# Patient Record
Sex: MALE | Race: WHITE | NOT HISPANIC OR LATINO | Employment: FULL TIME | URBAN - METROPOLITAN AREA
[De-identification: names, ages, dates, MRNs, and addresses within clinical notes are randomized per-mention and may not be internally consistent; named-entity substitution may affect disease eponyms.]

---

## 2018-01-22 DIAGNOSIS — E78.2 MIXED HYPERLIPIDEMIA: ICD-10-CM

## 2018-01-22 RX ORDER — ATORVASTATIN CALCIUM 40 MG/1
40 TABLET, FILM COATED ORAL DAILY
Qty: 90 TABLET | Refills: 0 | Status: SHIPPED | OUTPATIENT
Start: 2018-01-22 | End: 2018-03-13

## 2018-01-22 NOTE — TELEPHONE ENCOUNTER
"Requested Prescriptions   Pending Prescriptions Disp Refills     atorvastatin (LIPITOR) 40 MG tablet 90 tablet 3    Last Written Prescription Date:  12/20/2016  Last Fill Quantity: 90 tablet,  # refills: 3   Last Office Visit with G, P or [facility] prescribing provider:  12/20/2016   Future Office Visit:      Sig: Take 1 tablet (40 mg) by mouth daily    Statins Protocol Failed    1/22/2018 10:15 AM       Failed - LDL on file in past 12 months    Recent Labs   Lab Test  12/08/16   0815   LDL  199*            Failed - Recent or future visit with authorizing provider    Patient had office visit in the last year or has a visit in the next 30 days with authorizing provider.  See \"Patient Info\" tab in inbasket, or \"Choose Columns\" in Meds & Orders section of the refill encounter.            Passed - No abnormal creatine kinase in past 12 months    No lab results found.         Passed - Patient is age 18 or older          Faisal COS[employer]  "

## 2018-01-22 NOTE — TELEPHONE ENCOUNTER
Spoke with pt and scheduled for Friday  Prescription approved per [facility] Refill Protocol.  Luis Eduardo Whitt RN, BSN

## 2018-03-13 ENCOUNTER — OFFICE VISIT (OUTPATIENT)
Dept: FAMILY MEDICINE | Facility: CLINIC | Age: 33
End: 2018-03-13
Payer: COMMERCIAL

## 2018-03-13 VITALS
DIASTOLIC BLOOD PRESSURE: 86 MMHG | TEMPERATURE: 98.2 F | HEART RATE: 78 BPM | SYSTOLIC BLOOD PRESSURE: 120 MMHG | HEIGHT: 72 IN | BODY MASS INDEX: 30.75 KG/M2 | WEIGHT: 227 LBS

## 2018-03-13 DIAGNOSIS — R07.9 CHEST PAIN, UNSPECIFIED TYPE: ICD-10-CM

## 2018-03-13 DIAGNOSIS — E78.2 MIXED HYPERLIPIDEMIA: ICD-10-CM

## 2018-03-13 DIAGNOSIS — Z00.00 ENCOUNTER FOR ROUTINE ADULT HEALTH EXAMINATION WITHOUT ABNORMAL FINDINGS: Primary | ICD-10-CM

## 2018-03-13 LAB
CHOLEST SERPL-MCNC: 221 MG/DL
HBA1C MFR BLD: 5.5 % (ref 4.3–6)
HDLC SERPL-MCNC: 27 MG/DL
LDLC SERPL CALC-MCNC: 134 MG/DL
NONHDLC SERPL-MCNC: 194 MG/DL
TRIGL SERPL-MCNC: 300 MG/DL

## 2018-03-13 PROCEDURE — 36415 COLL VENOUS BLD VENIPUNCTURE: CPT | Performed by: FAMILY MEDICINE

## 2018-03-13 PROCEDURE — 80061 LIPID PANEL: CPT | Performed by: FAMILY MEDICINE

## 2018-03-13 PROCEDURE — 99213 OFFICE O/P EST LOW 20 MIN: CPT | Mod: 25 | Performed by: FAMILY MEDICINE

## 2018-03-13 PROCEDURE — 99395 PREV VISIT EST AGE 18-39: CPT | Performed by: FAMILY MEDICINE

## 2018-03-13 PROCEDURE — 83036 HEMOGLOBIN GLYCOSYLATED A1C: CPT | Performed by: FAMILY MEDICINE

## 2018-03-13 PROCEDURE — 93000 ELECTROCARDIOGRAM COMPLETE: CPT | Performed by: FAMILY MEDICINE

## 2018-03-13 RX ORDER — ATORVASTATIN CALCIUM 40 MG/1
40 TABLET, FILM COATED ORAL DAILY
Qty: 90 TABLET | Refills: 3 | Status: SHIPPED | OUTPATIENT
Start: 2018-03-13 | End: 2023-12-11

## 2018-03-13 NOTE — PROGRESS NOTES
SUBJECTIVE:   CC: Vinod Hdez is an 32 year old male who presents for preventative health visit.     Physical   Annual:     Getting at least 3 servings of Calcium per day::  NO    Bi-annual eye exam::  Yes    Dental care twice a year::  Yes    Sleep apnea or symptoms of sleep apnea::  None    Diet::  Regular (no restrictions) and Breakfast skipped    Frequency of exercise::  None    Taking medications regularly::  Yes    Medication side effects::  None    Additional concerns today::  YES          2 episodes of left sided chest tightness, resolved on it's own after a few minmutes, radiated to the left shoulder. No dyspnea. First episode, he was sitting at his desk, did not feel overly stressed. Second episode, he was giving a tour, up and walking and talking.     Says he still feels some mild pressure in the same area.     No recent injuries. No pain to palpation of the chest. No recent URI symptoms.     On lipitor.   Dad with CVD, MI in 40s.      Today's PHQ-2 Score:   PHQ-2 ( 1999 Pfizer) 3/13/2018   Q1: Little interest or pleasure in doing things 0   Q2: Feeling down, depressed or hopeless 0   PHQ-2 Score 0   Q1: Little interest or pleasure in doing things Not at all   Q2: Feeling down, depressed or hopeless Not at all   PHQ-2 Score 0       Abuse: Current or Past(Physical, Sexual or Emotional)- No  Do you feel safe in your environment - Yes    Social History   Substance Use Topics     Smoking status: Never Smoker     Smokeless tobacco: Never Used     Alcohol use 6.0 oz/week      Comment: occ     No flowsheet data found.    Last PSA: No results found for: PSA    Reviewed orders with patient. Reviewed health maintenance and updated orders accordingly - Yes  Patient Active Problem List   Diagnosis     Hyperlipidemia     Elevated blood sugar     Past Surgical History:   Procedure Laterality Date     FLEXIBLE SIGMOIDOSCOPY  2002    hematochezia - negative       Social History   Substance Use Topics     Smoking  "status: Never Smoker     Smokeless tobacco: Never Used     Alcohol use 6.0 oz/week      Comment: occ     Family History   Problem Relation Age of Onset     HEART DISEASE Father      palpitations     Lipids Father      DIABETES Paternal Grandfather      Genitourinary Problems Mother      kidney stones     Lipids Mother            Reviewed and updated as needed this visit by clinical staff  Tobacco  Meds  Med Hx  Surg Hx  Fam Hx  Soc Hx        Reviewed and updated as needed this visit by Provider          Review of Systems  C: NEGATIVE for fever, chills, change in weight  I: NEGATIVE for worrisome rashes, moles or lesions  E: NEGATIVE for vision changes or irritation  ENT: NEGATIVE for ear, mouth and throat problems  R: NEGATIVE for significant cough or SOB  CV: NEGATIVE for chest pain, palpitations or peripheral edema  GI: NEGATIVE for nausea, abdominal pain, heartburn, or change in bowel habits   male: negative for dysuria, hematuria, decreased urinary stream, erectile dysfunction, urethral discharge  M: NEGATIVE for significant arthralgias or myalgia  N: NEGATIVE for weakness, dizziness or paresthesias  P: NEGATIVE for changes in mood or affect    OBJECTIVE:   /86 (BP Location: Right arm, Patient Position: Sitting, Cuff Size: Adult Large)  Pulse 78  Temp 98.2  F (36.8  C) (Oral)  Ht 5' 11.75\" (1.822 m)  Wt 227 lb (103 kg)  BMI 31 kg/m2    Physical Exam  GENERAL: healthy, alert and no distress  EYES: Eyes grossly normal to inspection, PERRL and conjunctivae and sclerae normal  HENT: ear canals and TM's normal, nose and mouth without ulcers or lesions  NECK: no adenopathy, no asymmetry, masses, or scars and thyroid normal to palpation  RESP: lungs clear to auscultation - no rales, rhonchi or wheezes  CV: regular rate and rhythm, normal S1 S2, no S3 or S4, no murmur, click or rub, no peripheral edema and peripheral pulses strong  ABDOMEN: soft, nontender, no hepatosplenomegaly, no masses and bowel " "sounds normal  MS: no ttp along the left chest wall, no gross musculoskeletal defects noted, no edema  SKIN: no suspicious lesions or rashes  NEURO: Normal strength and tone, mentation intact and speech normal  PSYCH: mentation appears normal, affect normal/bright    EKG - normal    ASSESSMENT/PLAN:     1. Encounter for routine adult health examination without abnormal findings  - Lipid panel reflex to direct LDL Fasting  - Hemoglobin A1c    2. Chest pain, unspecified type - young for angina, but symptoms are suspicious. As dad had CVD at a young age, will get stress testing to evaluate. He is agreeable.  - EKG 12-lead complete w/read - Clinics  - CARDIOLOGY PROCEDURE ADULT REFERRAL  - Exercise Stress Echocardiogram; Future    3. Mixed hyperlipidemia - previously stable, refills  - atorvastatin (LIPITOR) 40 MG tablet; Take 1 tablet (40 mg) by mouth daily  Dispense: 90 tablet; Refill: 3    COUNSELING:   Reviewed preventive health counseling, as reflected in patient instructions     reports that he has never smoked. He has never used smokeless tobacco.    Estimated body mass index is 31 kg/(m^2) as calculated from the following:    Height as of this encounter: 5' 11.75\" (1.822 m).    Weight as of this encounter: 227 lb (103 kg).       Marium Momin MD  [facility]    Answers for HPI/ROS submitted by the patient on 3/13/2018   PHQ-2 Score: 0    "

## 2018-03-13 NOTE — MR AVS SNAPSHOT
After Visit Summary   3/13/2018    Vinod Hdez    MRN: [id number]           Patient Information     Date Of Birth          1985        Visit Information        Provider Department      3/13/2018 7:40 AM Marium Momin MD [facility]        Today's Diagnoses     Encounter for routine adult health examination without abnormal findings    -  1    Chest pain, unspecified type        Mixed hyperlipidemia          Care Instructions      Preventive Health Recommendations  Male Ages 26 - 39    Yearly exam:             See your health care provider every year in order to  o   Review health changes.   o   Discuss preventive care.    o   Review your medicines if your doctor has prescribed any.    You should be tested each year for STDs (sexually transmitted diseases), if you re at risk.     After age 35, talk to your provider about cholesterol testing. If you are at risk for heart disease, have your cholesterol tested at least every 5 years.     If you are at risk for diabetes, you should have a diabetes test (fasting glucose).  Shots: Get a flu shot each year. Get a tetanus shot every 10 years.     Nutrition:    Eat at least 5 servings of fruits and vegetables daily.     Eat whole-grain bread, whole-wheat pasta and brown rice instead of white grains and rice.     Talk to your provider about Calcium and Vitamin D.     Lifestyle    Exercise for at least 150 minutes a week (30 minutes a day, 5 days a week). This will help you control your weight and prevent disease.     Limit alcohol to one drink per day.     No smoking.     Wear sunscreen to prevent skin cancer.     See your dentist every six months for an exam and cleaning.             Follow-ups after your visit        Additional Services     CARDIOLOGY PROCEDURE ADULT REFERRAL       Your provider has referred you to:   [facility]: [facility] [phone]    [email or web address]    Cardiology procedures to be completed: stress echo, reason for procedure left sided chest pain episodes    Please be aware that coverage of these services is subject to the terms and limitations of your health insurance plan.  Call member services at your health plan with any benefit or coverage questions.     Please bring the following to your appointment:  >>   Any x-rays, CTs or MRIs which have been performed.  Contact the facility where they were done to arrange for  prior to your scheduled appointment.   >>   List of current medications  >>   This referral request   >>   Any documents/labs given to you for this referral    [facility]   For scheduling questions call [phone]    [facility]  For scheduling questions call [phone]                  Future tests that were ordered for you today     Open Future Orders        Priority Expected Expires Ordered    Exercise Stress Echocardiogram Routine  3/13/2019 3/13/2018            Who to contact     If you have questions or need follow up information about today's clinic visit or your schedule please contact [facility] directly at [phone].  Normal or non-critical lab and imaging results will be communicated to you by MyChart, letter or phone within 4 business days after the clinic has received the results. If you do not hear from us within 7 days, please contact the clinic through Check[employer]Oh[employer] or phone. If you have a critical or abnormal lab result, we will notify you by phone as soon as possible.  Submit refill requests through [employer] or call your pharmacy and they will forward the refill request to us. Please allow 3 business days for your refill to be completed.          Additional Information About Your Visit        Check[employer]oudsnap Information     [employer] gives you secure access to your electronic health record. If you see  "a primary care provider, you can also send messages to your care team and make appointments. If you have questions, please call your primary care clinic.  If you do not have a primary care provider, please call [phone] and they will assist you.        Care EveryWhere ID     This is your Care EveryWhere ID. This could be used by other organizations to access your [facility] medical records  [id number]        Your Vitals Were     Pulse Temperature Height BMI (Body Mass Index)          78 98.2  F (36.8  C) (Oral) 5' 11.75\" (1.822 m) 31 kg/m2         Blood Pressure from Last 3 Encounters:   03/13/18 120/86   12/20/16 112/78   08/16/16 116/78    Weight from Last 3 Encounters:   03/13/18 227 lb (103 kg)   12/20/16 213 lb (96.6 kg)   08/16/16 215 lb (97.5 kg)              We Performed the Following     CARDIOLOGY PROCEDURE ADULT REFERRAL     EKG 12-lead complete w/read - Clinics     Hemoglobin A1c     Lipid panel reflex to direct LDL Fasting          Where to get your medicines      These medications were sent to [facility]/pharmacy #[id number] - [place], [place] - [address]  [address], [facility] [address]    Hours:  [facility] Phone:  [phone]     atorvastatin 40 MG tablet          Primary Care Provider Office Phone # Fax #    Marium Herminio Momin [phone] [address]  [facility] [address]        Equal Access to Services     ALLY DE JESUS AH: Hadii aad ku hadasho Soomaali, waaxda luqadaha, qaybta kaalmada adeegyada, cliff doshi. So [facility] [phone].    ATENCIÓN: Si habla español, tiene a martin disposición servicios gratuitos de asistencia lingüística. Llame al [phone].    We comply with applicable federal civil rights laws and [place] laws. We do not discriminate on the basis of race, color, national origin, age, disability, sex, sexual orientation, or gender identity.            Thank you!     Thank you for choosing [facility]  for your care. Our goal is always to provide you with excellent care. Hearing back from our patients is one way we can continue to improve our services. Please take a few minutes to complete the written survey that you may receive in the mail after your visit with us. Thank you!             Your Updated Medication List - Protect others around you: Learn how to safely use, store and throw away your medicines at www.disposemymeds.org.          This list is accurate as of 3/13/18  8:16 AM.  Always use your most recent med list.                   Brand Name Dispense Instructions for use Diagnosis    atorvastatin 40 MG tablet    LIPITOR    90 tablet    Take 1 tablet (40 mg) by mouth daily    Mixed hyperlipidemia       FISH OIL OMEGA-3 PO      Take 1,000 mg by mouth    Chest pain, unspecified type

## 2018-03-13 NOTE — NURSING NOTE
"Chief Complaint   Patient presents with     Physical     fasting for labs       Initial /86 (BP Location: Right arm, Patient Position: Sitting, Cuff Size: Adult Large)  Pulse 78  Temp 98.2  F (36.8  C) (Oral)  Ht 5' 11.75\" (1.822 m)  Wt 227 lb (103 kg)  BMI 31 kg/m2 Estimated body mass index is 31 kg/(m^2) as calculated from the following:    Height as of this encounter: 5' 11.75\" (1.822 m).    Weight as of this encounter: 227 lb (103 kg).  Medication Reconciliation: complete     Jaun Hernandez CMA          "

## 2018-03-21 ENCOUNTER — HOSPITAL ENCOUNTER (OUTPATIENT)
Dept: CARDIOLOGY | Facility: CLINIC | Age: 33
Discharge: HOME | End: 2018-03-21
Attending: FAMILY MEDICINE | Admitting: FAMILY MEDICINE
Payer: COMMERCIAL

## 2018-03-21 DIAGNOSIS — R07.9 CHEST PAIN, UNSPECIFIED TYPE: ICD-10-CM

## 2018-03-21 PROCEDURE — 93321 DOPPLER ECHO F-UP/LMTD STD: CPT | Mod: 26 | Performed by: INTERNAL MEDICINE

## 2018-03-21 PROCEDURE — 93016 CV STRESS TEST SUPVJ ONLY: CPT | Performed by: INTERNAL MEDICINE

## 2018-03-21 PROCEDURE — 93350 STRESS TTE ONLY: CPT | Mod: 26 | Performed by: INTERNAL MEDICINE

## 2018-03-21 PROCEDURE — 93325 DOPPLER ECHO COLOR FLOW MAPG: CPT | Mod: 26 | Performed by: INTERNAL MEDICINE

## 2018-03-21 PROCEDURE — 25500064 ZZH RX 255 OP 636: Performed by: FAMILY MEDICINE

## 2018-03-21 PROCEDURE — 93018 CV STRESS TEST I&R ONLY: CPT | Performed by: INTERNAL MEDICINE

## 2018-03-21 PROCEDURE — 93321 DOPPLER ECHO F-UP/LMTD STD: CPT | Mod: TC

## 2018-03-21 RX ADMIN — HUMAN ALBUMIN MICROSPHERES AND PERFLUTREN 4 ML: 10; .22 INJECTION, SOLUTION INTRAVENOUS at 09:27

## 2019-11-08 ENCOUNTER — HEALTH MAINTENANCE LETTER (OUTPATIENT)
Age: 34
End: 2019-11-08

## 2020-12-06 ENCOUNTER — HEALTH MAINTENANCE LETTER (OUTPATIENT)
Age: 35
End: 2020-12-06

## 2021-09-25 ENCOUNTER — HEALTH MAINTENANCE LETTER (OUTPATIENT)
Age: 36
End: 2021-09-25

## 2021-11-19 ENCOUNTER — HOSPITAL ENCOUNTER (EMERGENCY)
Facility: CLINIC | Age: 36
Discharge: HOME | End: 2021-11-19
Attending: EMERGENCY MEDICINE | Admitting: EMERGENCY MEDICINE
Payer: COMMERCIAL

## 2021-11-19 ENCOUNTER — APPOINTMENT (OUTPATIENT)
Dept: GENERAL RADIOLOGY | Facility: CLINIC | Age: 36
End: 2021-11-19
Attending: EMERGENCY MEDICINE
Payer: COMMERCIAL

## 2021-11-19 VITALS
OXYGEN SATURATION: 100 % | SYSTOLIC BLOOD PRESSURE: 115 MMHG | DIASTOLIC BLOOD PRESSURE: 78 MMHG | RESPIRATION RATE: 16 BRPM | HEART RATE: 107 BPM | TEMPERATURE: 97.2 F

## 2021-11-19 DIAGNOSIS — R07.9 CHEST PAIN, UNSPECIFIED TYPE: ICD-10-CM

## 2021-11-19 DIAGNOSIS — R03.0 ELEVATED BP WITHOUT DIAGNOSIS OF HYPERTENSION: ICD-10-CM

## 2021-11-19 LAB
ANION GAP SERPL CALCULATED.3IONS-SCNC: 6 MMOL/L (ref 3–14)
BASOPHILS # BLD AUTO: 0 10E3/UL (ref 0–0.2)
BASOPHILS NFR BLD AUTO: 0 %
BUN SERPL-MCNC: 17 MG/DL (ref 7–30)
CALCIUM SERPL-MCNC: 9.5 MG/DL (ref 8.5–10.1)
CHLORIDE BLD-SCNC: 105 MMOL/L (ref 94–109)
CO2 SERPL-SCNC: 23 MMOL/L (ref 20–32)
CREAT SERPL-MCNC: 1.01 MG/DL (ref 0.66–1.25)
D DIMER PPP FEU-MCNC: <0.27 UG/ML FEU (ref 0–0.5)
EOSINOPHIL # BLD AUTO: 0.3 10E3/UL (ref 0–0.7)
EOSINOPHIL NFR BLD AUTO: 4 %
ERYTHROCYTE [DISTWIDTH] IN BLOOD BY AUTOMATED COUNT: 11.3 % (ref 10–15)
GFR SERPL CREATININE-BSD FRML MDRD: >90 ML/MIN/1.73M2
GLUCOSE BLD-MCNC: 111 MG/DL (ref 70–99)
HCT VFR BLD AUTO: 48 % (ref 40–53)
HGB BLD-MCNC: 15.5 G/DL (ref 13.3–17.7)
HOLD SPECIMEN: NORMAL
IMM GRANULOCYTES # BLD: 0 10E3/UL
IMM GRANULOCYTES NFR BLD: 0 %
LYMPHOCYTES # BLD AUTO: 2.1 10E3/UL (ref 0.8–5.3)
LYMPHOCYTES NFR BLD AUTO: 26 %
MCH RBC QN AUTO: 30.3 PG (ref 26.5–33)
MCHC RBC AUTO-ENTMCNC: 32.3 G/DL (ref 31.5–36.5)
MCV RBC AUTO: 94 FL (ref 78–100)
MONOCYTES # BLD AUTO: 0.7 10E3/UL (ref 0–1.3)
MONOCYTES NFR BLD AUTO: 8 %
NEUTROPHILS # BLD AUTO: 5 10E3/UL (ref 1.6–8.3)
NEUTROPHILS NFR BLD AUTO: 62 %
NRBC # BLD AUTO: 0 10E3/UL
NRBC BLD AUTO-RTO: 0 /100
PLATELET # BLD AUTO: 288 10E3/UL (ref 150–450)
POTASSIUM BLD-SCNC: 3.9 MMOL/L (ref 3.4–5.3)
RBC # BLD AUTO: 5.11 10E6/UL (ref 4.4–5.9)
SODIUM SERPL-SCNC: 134 MMOL/L (ref 133–144)
TROPONIN I SERPL-MCNC: <0.015 UG/L (ref 0–0.04)
TROPONIN I SERPL-MCNC: <0.015 UG/L (ref 0–0.04)
WBC # BLD AUTO: 8.1 10E3/UL (ref 4–11)

## 2021-11-19 PROCEDURE — 36415 COLL VENOUS BLD VENIPUNCTURE: CPT | Performed by: EMERGENCY MEDICINE

## 2021-11-19 PROCEDURE — 84484 ASSAY OF TROPONIN QUANT: CPT | Performed by: EMERGENCY MEDICINE

## 2021-11-19 PROCEDURE — 93005 ELECTROCARDIOGRAM TRACING: CPT

## 2021-11-19 PROCEDURE — 99285 EMERGENCY DEPT VISIT HI MDM: CPT | Mod: 25

## 2021-11-19 PROCEDURE — 250N000013 HC RX MED GY IP 250 OP 250 PS 637: Performed by: EMERGENCY MEDICINE

## 2021-11-19 PROCEDURE — 85379 FIBRIN DEGRADATION QUANT: CPT | Performed by: EMERGENCY MEDICINE

## 2021-11-19 PROCEDURE — 80048 BASIC METABOLIC PNL TOTAL CA: CPT | Performed by: EMERGENCY MEDICINE

## 2021-11-19 PROCEDURE — 85025 COMPLETE CBC W/AUTO DIFF WBC: CPT | Performed by: EMERGENCY MEDICINE

## 2021-11-19 PROCEDURE — 71046 X-RAY EXAM CHEST 2 VIEWS: CPT

## 2021-11-19 RX ORDER — ASPIRIN 325 MG
325 TABLET ORAL ONCE
Status: COMPLETED | OUTPATIENT
Start: 2021-11-19 | End: 2021-11-19

## 2021-11-19 RX ORDER — MAGNESIUM HYDROXIDE/ALUMINUM HYDROXICE/SIMETHICONE 120; 1200; 1200 MG/30ML; MG/30ML; MG/30ML
30 SUSPENSION ORAL ONCE
Status: COMPLETED | OUTPATIENT
Start: 2021-11-19 | End: 2021-11-19

## 2021-11-19 RX ORDER — FAMOTIDINE 20 MG/1
20 TABLET, FILM COATED ORAL ONCE
Status: COMPLETED | OUTPATIENT
Start: 2021-11-19 | End: 2021-11-19

## 2021-11-19 RX ADMIN — ASPIRIN 325 MG ORAL TABLET 325 MG: 325 PILL ORAL at 19:44

## 2021-11-19 RX ADMIN — ALUMINUM HYDROXIDE, MAGNESIUM HYDROXIDE, AND SIMETHICONE 30 ML: 200; 200; 20 SUSPENSION ORAL at 19:44

## 2021-11-19 RX ADMIN — FAMOTIDINE 20 MG: 20 TABLET, FILM COATED ORAL at 19:44

## 2021-11-19 ASSESSMENT — ENCOUNTER SYMPTOMS
NAUSEA: 1
VOMITING: 0
ABDOMINAL PAIN: 0

## 2021-11-19 NOTE — ED TRIAGE NOTES
Pt presents to ED with c/o chest pain since 4:30 pm. Pt states that he has had a few sharp pains in the left side of his chest and has been intermittent since then. Chest pain now in both sides of chest. ABC intact.

## 2021-11-20 NOTE — DISCHARGE INSTRUCTIONS
Start Pepcid 20 mg 2x per day for 5 days.  See your doctor for close follow up,    Discharge Instructions  Chest Pain    You have been seen today for chest pain or discomfort.  At this time, your provider has found no signs that your chest pain is due to a serious or life-threatening condition, (or you have declined more testing and/or admission to the hospital). However, sometimes there is a serious problem that does not show up right away. Your evaluation today may not be complete and you may need further testing and evaluation.     Generally, every Emergency Department visit should have a follow-up clinic visit with either a primary or a specialty clinic/provider. Please follow-up as instructed by your emergency provider today.  Return to the Emergency Department if:  Your chest pain changes, gets worse, starts to happen more often, or comes with less activity.  You are newly short of breath.  You get very weak or tired.  You pass out or faint.  You have any new symptoms, like fever, cough, numb legs, or you cough up blood.  You have anything else that worries you.    Until you follow-up with your regular provider, please do the following:  Take one aspirin daily unless you have an allergy or are told not to by your provider.  If a stress test appointment has been made, go to the appointment.  If you have questions, contact your regular provider.  Follow-up with your regular provider/clinic as directed; this is very important.    If you were given a prescription for medicine here today, be sure to read all of the information (including the package insert) that comes with your prescription.  This will include important information about the medicine, its side effects, and any warnings that you need to know about.  The pharmacist who fills the prescription can provide more information and answer questions you may have about the medicine.  If you have questions or concerns that the pharmacist cannot address, please  call or return to the Emergency Department.       Remember that you can always come back to the Emergency Department if you are not able to see your regular provider in the amount of time listed above, if you get any new symptoms, or if there is anything that worries you.

## 2021-11-20 NOTE — ED PROVIDER NOTES
"  History   Chief Complaint:  Chest pain     HPI   Vinod Hdez is a 36 year old male with history of hyperlipidemia who presents with sudden onset chest pain that started approximately 2 hours ago when he was playing board games with his kids. Patient reports pain is intermittent in intensity and will \"take his breath away\" when most intense waves hit. States he had just gotten back from driving to [place] and back (1 hour each way) when pain started. He has had similar symptoms in the past, which he describes as a \"spasm.\" States they usually don't last this long and have always resolved on their own. He notes nausea when he was driving over here, but thinks it might have been due to anxiety. Patient states he has not been taking his high cholesterol medication (Lipitor) for the past 4 months. He took Tylenol, baby Aspirin, fish oil, and Lipitor without relief. He denies abdominal pain, leg pain/ swelling, and vomiting. No history of blood clots. Patient has had 3 stress tests in the past without any acute findings.    Review of Systems   Cardiovascular: Positive for chest pain. Negative for leg swelling.   Gastrointestinal: Positive for nausea. Negative for abdominal pain and vomiting.   All other systems reviewed and are negative.    Allergies:  The patient has no known allergies.     Medications:  Lipitor (not taking)    Past Medical History:     Hyperlipidemia      Past Surgical History:    Flexible sigmoidoscopy     Family History:    Father: heart disease, palpitations, lipids  Mother: kidney stones, lipids    Social History:  The patient presents to the ED alone.   The patient is  with children.     Physical Exam     Patient Vitals for the past 24 hrs:   BP Temp Temp src Pulse Resp SpO2   11/19/21 2102 115/78 -- -- -- -- --   11/19/21 1749 (!) 151/114 97.2  F (36.2  C) Temporal 107 16 100 %     Physical Exam  Gen: alert  HEENT: PERRL, oropharynx clear  Neck: normal ROM  CV: RRR, no murmurs, " 2+ distal pulses in all 4 extremities  Chest: no tenderness over the chest wall  Pulm: breath sounds equal, lungs clear  Abd: Soft, nontender  Back: no evidence of injury  MSK: no lower extremity edema, no calf tenderness  Skin: no rash  Neuro: alert, appropriate conversation and interaction    Emergency Department Course   ECG  ECG obtained at 1745, ECG read at 1816  Sinus tachycardia. Otherwise normal ECG.    Rate 106 bpm. VA interval 198 ms. QRS duration 108 ms. QT/QTc 334/443 ms. P-R-T axes 52 59 29.     Imaging:  Chest XR,  PA & LAT   Final Result   IMPRESSION: Negative chest.        Report per radiology    Laboratory:  Labs Ordered and Resulted from Time of ED Arrival to Time of ED Departure   BASIC METABOLIC PANEL - Abnormal       Result Value    Sodium 134      Potassium 3.9      Chloride 105      Carbon Dioxide (CO2) 23      Anion Gap 6      Urea Nitrogen 17      Creatinine 1.01      Calcium 9.5      Glucose 111 (*)     GFR Estimate >90     TROPONIN I - Normal    Troponin I <0.015     D DIMER QUANTITATIVE - Normal    D-Dimer Quantitative <0.27     TROPONIN I - Normal    Troponin I <0.015     CBC WITH PLATELETS AND DIFFERENTIAL    WBC Count 8.1      RBC Count 5.11      Hemoglobin 15.5      Hematocrit 48.0      MCV 94      MCH 30.3      MCHC 32.3      RDW 11.3      Platelet Count 288      % Neutrophils 62      % Lymphocytes 26      % Monocytes 8      % Eosinophils 4      % Basophils 0      % Immature Granulocytes 0      NRBCs per 100 WBC 0      Absolute Neutrophils 5.0      Absolute Lymphocytes 2.1      Absolute Monocytes 0.7      Absolute Eosinophils 0.3      Absolute Basophils 0.0      Absolute Immature Granulocytes 0.0      Absolute NRBCs 0.0        Emergency Department Course:  Reviewed:  I reviewed nursing notes, vitals, past medical history and Care Everywhere    Assessments:  1812 I obtained history and examined the patient as noted above.   2012 I rechecked the patient and explained findings.   2100 I  rechecked the patient and explained findings. Rechecked blood pressure was 115/78.    Interventions:  1944 Pepcid 20mg oral  1944 Asa 325mg oral  1944 Maalox 30mL oral    Disposition:  The patient was discharged to home.     Impression & Plan     Medical Decision Making:  Patient presents for chest pain.  Chest pain started approximately 2 hours prior to arrival.  Initial EKG showed sinus tachycardia without any ischemic change.  Troponin and delta troponin are both negative.  Chest pain has dissipated here.  Initial blood pressure was elevated however normalized on recheck to 115/78.  No history of hypertension.  Chest x-ray negative.  Low risk for PE but does travel frequently therefore D-dimer was obtained.  D-dimer returned negative.  I felt this was sufficient to exclude PE and this otherwise low risk patient.  This also makes dissection or other nefarious process less likely.  Chest x-ray negative for pneumonia pneumothorax cardiomegaly or other process.  I chest pain today does have complaints of GERD.  Recommended trial of Pepcid.  Close follow-up in primary care for recheck of blood pressure at follow-up of chest pain.  Patient also notes that he is out of his cholesterol medication encouraged him to address this with his doctor.  Discharge home.    Diagnosis:    ICD-10-CM    1. Chest pain, unspecified type  R07.9    2. Elevated BP without diagnosis of hypertension  R03.0          Scribe Disclosure:  I, Mayelin Cobos, am serving as a scribe at 6:16 PM on 11/19/2021 to document services personally performed by Latanya Varma MD based on my observations and the provider's statements to me.      Latanya Varma MD  11/19/21 3016

## 2021-11-22 LAB
ATRIAL RATE - MUSE: 106 BPM
DIASTOLIC BLOOD PRESSURE - MUSE: NORMAL MMHG
INTERPRETATION ECG - MUSE: NORMAL
P AXIS - MUSE: 52 DEGREES
PR INTERVAL - MUSE: 198 MS
QRS DURATION - MUSE: 108 MS
QT - MUSE: 334 MS
QTC - MUSE: 443 MS
R AXIS - MUSE: 59 DEGREES
SYSTOLIC BLOOD PRESSURE - MUSE: NORMAL MMHG
T AXIS - MUSE: 29 DEGREES
VENTRICULAR RATE- MUSE: 106 BPM

## 2021-12-17 ENCOUNTER — OFFICE VISIT (OUTPATIENT)
Dept: URGENT CARE | Facility: URGENT CARE | Age: 36
End: 2021-12-17
Payer: COMMERCIAL

## 2021-12-17 VITALS
WEIGHT: 225 LBS | TEMPERATURE: 98.2 F | BODY MASS INDEX: 30.73 KG/M2 | OXYGEN SATURATION: 97 % | RESPIRATION RATE: 16 BRPM

## 2021-12-17 DIAGNOSIS — J06.9 VIRAL URI: Primary | ICD-10-CM

## 2021-12-17 PROCEDURE — U0005 INFEC AGEN DETEC AMPLI PROBE: HCPCS | Performed by: FAMILY MEDICINE

## 2021-12-17 PROCEDURE — 99203 OFFICE O/P NEW LOW 30 MIN: CPT | Performed by: FAMILY MEDICINE

## 2021-12-17 PROCEDURE — U0003 INFECTIOUS AGENT DETECTION BY NUCLEIC ACID (DNA OR RNA); SEVERE ACUTE RESPIRATORY SYNDROME CORONAVIRUS 2 (SARS-COV-2) (CORONAVIRUS DISEASE [COVID-19]), AMPLIFIED PROBE TECHNIQUE, MAKING USE OF HIGH THROUGHPUT TECHNOLOGIES AS DESCRIBED BY CMS-2020-01-R: HCPCS | Performed by: FAMILY MEDICINE

## 2021-12-17 NOTE — PROGRESS NOTES
ASSESSMENT/  PLAN:  Viral URI     - Symptomatic; Yes; 12/15/2021 COVID-19 Virus (Coronavirus) by PCR Nose     Has covid in the household- will need to quarantine while symptomatic  If the test for Covid 19 is positive will need to quarantine at least 10 days and may return to work/ school if fever free at least 1 day     Symptomatic treat with gargles, lozenges, and OTC analgesic as needed. Follow-up with primary clinic if not improving.    --------------------------------------------------------------------------------------------------------------------------------    SUBJECTIVE:  Chief Complaint   Patient presents with     Sick     fever, cough x 3 days, exposure to covid wife- ahs has POS covid     Vinod Hdez is a 36 year old male   with a chief complaint of cough, fever.  Onset of symptoms was 3 day(s) ago.    Course of illness: gradual onset, still present and constant.  Severity moderate  Current and Associated symptoms: fever, cough - non-productive and headache  Treatment measures tried include None tried.  Predisposing factors include { -  Wife positive for covid 19-  Children also sick    Past Medical History:   Diagnosis Date     NO ACTIVE PROBLEMS      Patient Active Problem List   Diagnosis     Hyperlipidemia     Elevated blood sugar         ALLERGIES:  No known drug allergy    MEDs  atorvastatin (LIPITOR) 40 MG tablet, Take 1 tablet (40 mg) by mouth daily  Omega-3 Fatty Acids (FISH OIL OMEGA-3 PO), Take 1,000 mg by mouth    No current facility-administered medications on file prior to visit.      Social History     Tobacco Use     Smoking status: Never Smoker     Smokeless tobacco: Never Used   Substance Use Topics     Alcohol use: Yes     Alcohol/week: 10.0 standard drinks     Comment: occ       Family History   Problem Relation Age of Onset     Heart Disease Father         palpitations     Lipids Father      Diabetes Paternal Grandfather      Genitourinary Problems Mother          kidney stones     Lipids Mother          ROS:  CONSTITUTIONAL:  fever, chills,    INTEGUMENTARY/SKIN: NEGATIVE for worrisome rashes or lesions  EYES: NEGATIVE for vision changes or irritation  ENT/MOUTH: NEGATIVE for ear, mouth and throat problems  RESP:NEGATIVE for significant cough or SOB  GI: NEGATIVE for nausea, abdominal pain,  or change in bowel habits    OBJECTIVE:   Temp 98.2  F (36.8  C) (Oral)   Resp 16   Wt 102.1 kg (225 lb)   SpO2 97%   BMI 30.73 kg/m    GENERAL APPEARANCE:   alert and no distress  EYES: EOMI,  PERRL, conjunctiva clear  HENT: ear canals and TM's normal.  Nose normal.  Pharynx erythematous with some exudate noted.  NECK: supple, non-tender to palpation, no adenopathy noted  RESP: lungs clear to auscultation - no rales, rhonchi or wheezes  CV: regular rates and rhythm, normal S1 S2, no murmur noted   SKIN: no suspicious lesions or rashes

## 2021-12-18 ENCOUNTER — TELEPHONE (OUTPATIENT)
Dept: URGENT CARE | Facility: URGENT CARE | Age: 36
End: 2021-12-18
Payer: COMMERCIAL

## 2021-12-18 LAB — SARS-COV-2 RNA RESP QL NAA+PROBE: NEGATIVE

## 2021-12-18 NOTE — TELEPHONE ENCOUNTER

## 2022-01-15 ENCOUNTER — HEALTH MAINTENANCE LETTER (OUTPATIENT)
Age: 37
End: 2022-01-15

## 2023-04-22 ENCOUNTER — HEALTH MAINTENANCE LETTER (OUTPATIENT)
Age: 38
End: 2023-04-22

## 2023-10-10 ENCOUNTER — HOSPITAL ENCOUNTER (EMERGENCY)
Facility: CLINIC | Age: 38
Discharge: HOME | End: 2023-10-10
Attending: EMERGENCY MEDICINE | Admitting: EMERGENCY MEDICINE
Payer: COMMERCIAL

## 2023-10-10 ENCOUNTER — APPOINTMENT (OUTPATIENT)
Dept: GENERAL RADIOLOGY | Facility: CLINIC | Age: 38
End: 2023-10-10
Attending: EMERGENCY MEDICINE
Payer: COMMERCIAL

## 2023-10-10 VITALS
RESPIRATION RATE: 18 BRPM | SYSTOLIC BLOOD PRESSURE: 127 MMHG | HEART RATE: 73 BPM | DIASTOLIC BLOOD PRESSURE: 90 MMHG | TEMPERATURE: 98 F | OXYGEN SATURATION: 96 %

## 2023-10-10 DIAGNOSIS — R07.89 CHEST PRESSURE: ICD-10-CM

## 2023-10-10 DIAGNOSIS — R06.00 DYSPNEA, UNSPECIFIED TYPE: ICD-10-CM

## 2023-10-10 LAB
ALBUMIN SERPL BCG-MCNC: 4.6 G/DL (ref 3.5–5.2)
ALP SERPL-CCNC: 40 U/L (ref 40–129)
ALT SERPL W P-5'-P-CCNC: 55 U/L (ref 0–70)
ANION GAP SERPL CALCULATED.3IONS-SCNC: 11 MMOL/L (ref 7–15)
AST SERPL W P-5'-P-CCNC: 28 U/L (ref 0–45)
BASO+EOS+MONOS # BLD AUTO: NORMAL 10*3/UL
BASO+EOS+MONOS NFR BLD AUTO: NORMAL %
BASOPHILS # BLD AUTO: 0 10E3/UL (ref 0–0.2)
BASOPHILS NFR BLD AUTO: 0 %
BILIRUB DIRECT SERPL-MCNC: <0.2 MG/DL (ref 0–0.3)
BILIRUB SERPL-MCNC: 0.6 MG/DL
BUN SERPL-MCNC: 16.3 MG/DL (ref 6–20)
CALCIUM SERPL-MCNC: 10.1 MG/DL (ref 8.6–10)
CHLORIDE SERPL-SCNC: 104 MMOL/L (ref 98–107)
CREAT SERPL-MCNC: 1.18 MG/DL (ref 0.67–1.17)
D DIMER PPP FEU-MCNC: <0.27 UG/ML FEU (ref 0–0.5)
DEPRECATED HCO3 PLAS-SCNC: 27 MMOL/L (ref 22–29)
EGFRCR SERPLBLD CKD-EPI 2021: 80 ML/MIN/1.73M2
EOSINOPHIL # BLD AUTO: 0.4 10E3/UL (ref 0–0.7)
EOSINOPHIL NFR BLD AUTO: 5 %
ERYTHROCYTE [DISTWIDTH] IN BLOOD BY AUTOMATED COUNT: 11.3 % (ref 10–15)
FLUAV RNA SPEC QL NAA+PROBE: NEGATIVE
FLUBV RNA RESP QL NAA+PROBE: NEGATIVE
GLUCOSE SERPL-MCNC: 98 MG/DL (ref 70–99)
HCT VFR BLD AUTO: 45.3 % (ref 40–53)
HGB BLD-MCNC: 15.3 G/DL (ref 13.3–17.7)
HOLD SPECIMEN: NORMAL
HOLD SPECIMEN: NORMAL
IMM GRANULOCYTES # BLD: 0 10E3/UL
IMM GRANULOCYTES NFR BLD: 0 %
LIPASE SERPL-CCNC: 31 U/L (ref 13–60)
LYMPHOCYTES # BLD AUTO: 1.6 10E3/UL (ref 0.8–5.3)
LYMPHOCYTES NFR BLD AUTO: 24 %
MCH RBC QN AUTO: 30.2 PG (ref 26.5–33)
MCHC RBC AUTO-ENTMCNC: 33.8 G/DL (ref 31.5–36.5)
MCV RBC AUTO: 90 FL (ref 78–100)
MONOCYTES # BLD AUTO: 0.5 10E3/UL (ref 0–1.3)
MONOCYTES NFR BLD AUTO: 7 %
NEUTROPHILS # BLD AUTO: 4.3 10E3/UL (ref 1.6–8.3)
NEUTROPHILS NFR BLD AUTO: 64 %
NRBC # BLD AUTO: 0 10E3/UL
NRBC BLD AUTO-RTO: 0 /100
NT-PROBNP SERPL-MCNC: <36 PG/ML (ref 0–450)
PLATELET # BLD AUTO: 271 10E3/UL (ref 150–450)
POTASSIUM SERPL-SCNC: 3.9 MMOL/L (ref 3.4–5.3)
PROT SERPL-MCNC: 7.4 G/DL (ref 6.4–8.3)
RBC # BLD AUTO: 5.06 10E6/UL (ref 4.4–5.9)
RSV RNA SPEC NAA+PROBE: NEGATIVE
SARS-COV-2 RNA RESP QL NAA+PROBE: NEGATIVE
SODIUM SERPL-SCNC: 142 MMOL/L (ref 135–145)
TROPONIN T SERPL HS-MCNC: <6 NG/L
TROPONIN T SERPL HS-MCNC: <6 NG/L
WBC # BLD AUTO: 6.7 10E3/UL (ref 4–11)

## 2023-10-10 PROCEDURE — 99285 EMERGENCY DEPT VISIT HI MDM: CPT | Mod: 25

## 2023-10-10 PROCEDURE — 36415 COLL VENOUS BLD VENIPUNCTURE: CPT | Performed by: EMERGENCY MEDICINE

## 2023-10-10 PROCEDURE — 250N000011 HC RX IP 250 OP 636: Mod: JZ | Performed by: EMERGENCY MEDICINE

## 2023-10-10 PROCEDURE — 87637 SARSCOV2&INF A&B&RSV AMP PRB: CPT | Performed by: EMERGENCY MEDICINE

## 2023-10-10 PROCEDURE — 93005 ELECTROCARDIOGRAM TRACING: CPT

## 2023-10-10 PROCEDURE — 83690 ASSAY OF LIPASE: CPT | Performed by: EMERGENCY MEDICINE

## 2023-10-10 PROCEDURE — 83880 ASSAY OF NATRIURETIC PEPTIDE: CPT | Performed by: EMERGENCY MEDICINE

## 2023-10-10 PROCEDURE — 250N000009 HC RX 250: Performed by: EMERGENCY MEDICINE

## 2023-10-10 PROCEDURE — 84484 ASSAY OF TROPONIN QUANT: CPT | Performed by: EMERGENCY MEDICINE

## 2023-10-10 PROCEDURE — 96374 THER/PROPH/DIAG INJ IV PUSH: CPT

## 2023-10-10 PROCEDURE — 84484 ASSAY OF TROPONIN QUANT: CPT | Mod: 91 | Performed by: EMERGENCY MEDICINE

## 2023-10-10 PROCEDURE — 80053 COMPREHEN METABOLIC PANEL: CPT | Performed by: EMERGENCY MEDICINE

## 2023-10-10 PROCEDURE — 258N000003 HC RX IP 258 OP 636: Performed by: EMERGENCY MEDICINE

## 2023-10-10 PROCEDURE — 71046 X-RAY EXAM CHEST 2 VIEWS: CPT

## 2023-10-10 PROCEDURE — 96361 HYDRATE IV INFUSION ADD-ON: CPT

## 2023-10-10 PROCEDURE — 85379 FIBRIN DEGRADATION QUANT: CPT | Performed by: EMERGENCY MEDICINE

## 2023-10-10 PROCEDURE — 250N000013 HC RX MED GY IP 250 OP 250 PS 637: Performed by: EMERGENCY MEDICINE

## 2023-10-10 PROCEDURE — 85025 COMPLETE CBC W/AUTO DIFF WBC: CPT | Performed by: EMERGENCY MEDICINE

## 2023-10-10 PROCEDURE — 82248 BILIRUBIN DIRECT: CPT | Performed by: EMERGENCY MEDICINE

## 2023-10-10 RX ORDER — ONDANSETRON 2 MG/ML
4 INJECTION INTRAMUSCULAR; INTRAVENOUS ONCE
Status: COMPLETED | OUTPATIENT
Start: 2023-10-10 | End: 2023-10-10

## 2023-10-10 RX ORDER — MAGNESIUM HYDROXIDE/ALUMINUM HYDROXICE/SIMETHICONE 120; 1200; 1200 MG/30ML; MG/30ML; MG/30ML
15 SUSPENSION ORAL ONCE
Status: COMPLETED | OUTPATIENT
Start: 2023-10-10 | End: 2023-10-10

## 2023-10-10 RX ORDER — LIDOCAINE HYDROCHLORIDE 20 MG/ML
5 SOLUTION OROPHARYNGEAL ONCE
Status: COMPLETED | OUTPATIENT
Start: 2023-10-10 | End: 2023-10-10

## 2023-10-10 RX ADMIN — ONDANSETRON 4 MG: 2 INJECTION INTRAMUSCULAR; INTRAVENOUS at 18:13

## 2023-10-10 RX ADMIN — SODIUM CHLORIDE 1000 ML: 9 INJECTION, SOLUTION INTRAVENOUS at 18:13

## 2023-10-10 RX ADMIN — ALUMINUM HYDROXIDE, MAGNESIUM HYDROXIDE, AND SIMETHICONE 15 ML: 200; 200; 20 SUSPENSION ORAL at 20:12

## 2023-10-10 RX ADMIN — LIDOCAINE HYDROCHLORIDE 5 ML: 20 SOLUTION ORAL at 20:12

## 2023-10-10 ASSESSMENT — ACTIVITIES OF DAILY LIVING (ADL)
ADLS_ACUITY_SCORE: 35
ADLS_ACUITY_SCORE: 35

## 2023-10-10 NOTE — ED TRIAGE NOTES
Sent tfrom . Starting yesterday jittery, gen fatigue, nausea, bilat chest pain into R shoulder,  BP elevated. Symptoms worse with movement improve at rest. States EKG was normal at  and given aspirin. ABCs intact A&Ox4

## 2023-10-10 NOTE — ED NOTES
"PIT/Triage Evaluation    Patient presented with constant central chest pain that began yesterday. He describes the pain as a \"throbbing\" sensation. He reports his pain being better when he lies down. He was not doing anything in particular when he first noticed the chest pain. He reports having these pains before as he has a history of hypercholesteremia, but this time it is not subsiding. He is experiencing shortness of breath that gets worse when using the restroom. He reports experiencing nausea that he reports feeling because he is feeling like he is \"about to pass out.\" He denies any nausea, history of DVT or cancer, recent travel, coughing up blood, fever, chills, or sore throat.    Exam is notable for:      HEENT:    Oropharynx is moist  Eyes:    Conjunctiva normal  Neck:     Supple, no meningismus.     CV:     Regular rate and rhythm.      No murmurs, rubs or gallops.       No unilateral leg swelling.       2+ radial pulses bilateral.       No lower extremity edema.  PULM:    Clear to auscultation bilateral.       No respiratory distress.   ABD:    Soft, non-tender, non-distended.       No rebound, guarding or rigidity.  MSK:     No gross deformity to all four extremities.   LYMPH:   No cervical lymphadenopathy.  NEURO:   Alert. Good muscle tone, no atrophy.  Skin:    Warm, dry and intact.    Psych:    Mood is good and affect is appropriate.      Appropriate interventions for symptom management were initiated if applicable.  Appropriate diagnostic tests were initiated if indicated.    Important information for subsequent clinician:    EKG nonischemic and without dysrhythmia.  No features concerning for pulmonary embolism, aortic dissection or aortic aneurysm.  Cardiac enzymes and chest x-ray ordered.    I briefly evaluated the patient and developed an initial plan of care. I discussed this plan and explained that this brief interaction does not constitute a full evaluation. Patient/family understands that " they should wait to be fully evaluated and discuss any test results with another clinician prior to leaving the hospital.       Seth Andrews MD  10/10/23 [id number]

## 2023-10-11 LAB
ATRIAL RATE - MUSE: 91 BPM
DIASTOLIC BLOOD PRESSURE - MUSE: NORMAL MMHG
INTERPRETATION ECG - MUSE: NORMAL
P AXIS - MUSE: 61 DEGREES
PR INTERVAL - MUSE: 160 MS
QRS DURATION - MUSE: 100 MS
QT - MUSE: 360 MS
QTC - MUSE: 442 MS
R AXIS - MUSE: 53 DEGREES
SYSTOLIC BLOOD PRESSURE - MUSE: NORMAL MMHG
T AXIS - MUSE: 57 DEGREES
VENTRICULAR RATE- MUSE: 91 BPM

## 2023-10-11 NOTE — ED PROVIDER NOTES
History     Chief Complaint:  Chest Pain       HPI   Vinod Hdez is a 38 year old male with left sided dull chest pain for 8 hours.  2 days of dyspnea.  Worse with exertion.  Cannot go up the stairs or ambulate without feeling out of breath.  There is not necessarily a exertional component to chest pain and it is now resolved.  No reflux or nausea vomiting or pain or reflux with lying flat.  Has no energy.  Fell asleep in the parking lot at [employer] because so worn down.  Family is in medicine got a bp cuff form them had isolated readings ranging 180 systolic.  No neuro sx or ha of worry.  No leg pain or swelling.  No hx of clots.  No cough runny nose body aches fever chills.  No abd pain.        Independent Historian:        Review of External Notes:      Medications:    atorvastatin (LIPITOR) 40 MG tablet  Omega-3 Fatty Acids (FISH OIL OMEGA-3 PO)        Past Medical History:    Past Medical History:   Diagnosis Date    NO ACTIVE PROBLEMS        Past Surgical History:    Past Surgical History:   Procedure Laterality Date    FLEXIBLE SIGMOIDOSCOPY  2002    hematochezia - negative          Physical Exam   Patient Vitals for the past 24 hrs:   BP Temp Pulse Resp SpO2   10/10/23 2240 -- -- 73 -- --   10/10/23 2230 -- -- 76 -- --   10/10/23 2220 -- -- 69 -- --   10/10/23 2210 -- -- 72 -- 96 %   10/10/23 2200 (!) 127/90 -- 73 -- 97 %   10/10/23 2121 -- -- 75 -- 98 %   10/10/23 2120 -- -- 68 -- 100 %   10/10/23 2119 -- -- 66 -- 97 %   10/10/23 2118 -- -- 62 -- 98 %   10/10/23 2117 -- -- 63 -- 99 %   10/10/23 2116 127/85 -- 71 -- --   10/10/23 2114 -- -- -- -- 100 %   10/10/23 2113 -- -- 73 -- 97 %   10/10/23 2112 -- -- 66 -- 99 %   10/10/23 2111 -- -- 79 -- 99 %   10/10/23 2110 -- -- 68 -- 99 %   10/10/23 1954 -- -- 73 -- 99 %   10/10/23 1953 -- -- 68 -- 98 %   10/10/23 1952 -- -- 70 -- 99 %   10/10/23 1951 135/88 -- 78 -- 97 %   10/10/23 1950 -- -- 79 -- 98 %   10/10/23 1747 (!) 155/105 98  F (36.7  C)  94 18 100 %        Physical Exam  General: Patient is well appearing. No distress.  Head: Atraumatic.  Eyes: Conjunctivae and EOM are normal. No scleral icterus.  Neck: Normal range of motion. Neck supple.   Cardiovascular: Normal rate, regular rhythm, normal heart sounds and intact distal pulses.   Pulmonary/Chest: Breath sounds normal. No respiratory distress.  Abdominal: Soft. Bowel sounds are normal. No distension. No tenderness. No rebound or guarding.   Musculoskeletal: Normal range of motion.  Skin: Warm and dry. No rash noted. Not diaphoretic.      Emergency Department Course   ECG  NSR HR 91, no injury patterns.    Imaging:  Chest XR,  PA & LAT   Final Result   IMPRESSION: Negative chest.        Report per radiology    Laboratory:  Labs Ordered and Resulted from Time of ED Arrival to Time of ED Departure   BASIC METABOLIC PANEL - Abnormal       Result Value    Sodium 142      Potassium 3.9      Chloride 104      Carbon Dioxide (CO2) 27      Anion Gap 11      Urea Nitrogen 16.3      Creatinine 1.18 (*)     GFR Estimate 80      Calcium 10.1 (*)     Glucose 98     TROPONIN T, HIGH SENSITIVITY - Normal    Troponin T, High Sensitivity <6     HEPATIC FUNCTION PANEL - Normal    Protein Total 7.4      Albumin 4.6      Bilirubin Total 0.6      Alkaline Phosphatase 40      AST 28      ALT 55      Bilirubin Direct <0.20     LIPASE - Normal    Lipase 31     NT PROBNP INPATIENT - Normal    N terminal Pro BNP Inpatient <36     INFLUENZA A/B, RSV, & SARS-COV2 PCR - Normal    Influenza A PCR Negative      Influenza B PCR Negative      RSV PCR Negative      SARS CoV2 PCR Negative     TROPONIN T, HIGH SENSITIVITY - Normal    Troponin T, High Sensitivity <6     D DIMER QUANTITATIVE - Normal    D-Dimer Quantitative <0.27     CBC WITH PLATELETS AND DIFFERENTIAL    WBC Count 6.7      RBC Count 5.06      Hemoglobin 15.3      Hematocrit 45.3      MCV 90      MCH 30.2      MCHC 33.8      RDW 11.3      Platelet Count 271      %  Neutrophils 64      % Lymphocytes 24      % Monocytes 7      Mids % (Monos, Eos, Basos)        % Eosinophils 5      % Basophils 0      % Immature Granulocytes 0      NRBCs per 100 WBC 0      Absolute Neutrophils 4.3      Absolute Lymphocytes 1.6      Absolute Monocytes 0.5      Mids Abs (Monos, Eos, Basos)        Absolute Eosinophils 0.4      Absolute Basophils 0.0      Absolute Immature Granulocytes 0.0      Absolute NRBCs 0.0          Procedures       Emergency Department Course & Assessments:             Interventions:  Medications   ondansetron (ZOFRAN) injection 4 mg (4 mg Intravenous $Given 10/10/23 1813)   sodium chloride 0.9% BOLUS 1,000 mL (0 mLs Intravenous Stopped 10/10/23 2054)   alum & mag hydroxide-simethicone (MAALOX) suspension 15 mL (15 mLs Oral $Given 10/10/23 2012)   lidocaine (viscous) (XYLOCAINE) 2 % solution 5 mL (5 mLs Mouth/Throat $Given 10/10/23 2012)        Assessments:      Independent Interpretation (X-rays, CTs, rhythm strip):  CXR normal.     Consultations/Discussion of Management or Tests:         Social Determinants of Health affecting care:       Disposition:  The patient was discharged to home.     Impression & Plan        Medical Decision Making:  Vinod Hdez is a 38 year old male presented to the Emergency Department with a complaint of chest pain. Fortunately the workup in the ED has been unremarkable and at this time I am not concerned for ACS. The EKG shows NSR. The troponin is negative x2.     I considered other possible causes of chest pain including PE (negative d-dimer), infection, pneumothorax, aortic dissection, and even more benign causes such as reflux and esophageal motility issues. The physical exam, laboratory, and radiological findings listed above make life threatening conditions less likely. At this time I believe the patient is safe for discharge. I have encouraged close outpatient follow up. Discussed the isolated bp that was not reproduced here and to  start BP logging in prep for pcp.     Anticipatory guidance given prior to discharge.       Diagnosis:    ICD-10-CM    1. Chest pressure  R07.89       2. Dyspnea, unspecified type  R06.00            Discharge Medications:  Discharge Medication List as of 10/10/2023 10:36 PM               10/10/2023   Lorenzo Narvaez MD Stevens, Andrew C, MD  10/10/23 7399

## 2023-10-11 NOTE — ED NOTES
Ambulation trial w/ pulse ox completed. Pt denied sob while walking. Pt complains of dizziness while walking. O2 maintained 97-98% w/ ambulation. HR peaked at 88 while ambulating. MD notified.

## 2023-12-11 ENCOUNTER — OFFICE VISIT (OUTPATIENT)
Dept: CARDIOLOGY | Facility: CLINIC | Age: 38
End: 2023-12-11
Payer: COMMERCIAL

## 2023-12-11 VITALS
OXYGEN SATURATION: 97 % | BODY MASS INDEX: 31.23 KG/M2 | SYSTOLIC BLOOD PRESSURE: 130 MMHG | DIASTOLIC BLOOD PRESSURE: 104 MMHG | WEIGHT: 230.6 LBS | HEART RATE: 89 BPM | HEIGHT: 72 IN

## 2023-12-11 DIAGNOSIS — E78.5 DYSLIPIDEMIA: ICD-10-CM

## 2023-12-11 DIAGNOSIS — R07.9 CHEST PAIN ON EXERTION: ICD-10-CM

## 2023-12-11 DIAGNOSIS — R06.09 DYSPNEA ON EXERTION: ICD-10-CM

## 2023-12-11 DIAGNOSIS — R07.89 ATYPICAL CHEST PAIN: ICD-10-CM

## 2023-12-11 DIAGNOSIS — I10 BENIGN ESSENTIAL HYPERTENSION: ICD-10-CM

## 2023-12-11 DIAGNOSIS — R07.89 CHEST PRESSURE: Primary | ICD-10-CM

## 2023-12-11 PROCEDURE — 93000 ELECTROCARDIOGRAM COMPLETE: CPT | Performed by: INTERNAL MEDICINE

## 2023-12-11 PROCEDURE — 99205 OFFICE O/P NEW HI 60 MIN: CPT | Performed by: INTERNAL MEDICINE

## 2023-12-11 RX ORDER — ASPIRIN 81 MG/1
81 TABLET ORAL DAILY
COMMUNITY
End: 2024-02-14

## 2023-12-11 RX ORDER — OLMESARTAN MEDOXOMIL AND HYDROCHLOROTHIAZIDE 20/12.5 20; 12.5 MG/1; MG/1
1 TABLET ORAL DAILY
Qty: 90 TABLET | Refills: 3 | Status: SHIPPED | OUTPATIENT
Start: 2023-12-11

## 2023-12-11 RX ORDER — ROSUVASTATIN CALCIUM 40 MG/1
40 TABLET, COATED ORAL AT BEDTIME
Qty: 90 TABLET | Refills: 3 | Status: SHIPPED | OUTPATIENT
Start: 2023-12-11

## 2023-12-11 NOTE — PATIENT INSTRUCTIONS
December 11, 2023    Thank you for allowing our Cardiology team to participate in your care.     Please note the following changes to your heart treatment plan:     Medication changes:   - stop atorvastatin    - start rosuvastatin 40mg at bedtime   - start olmesartan-hydrochlorothiazide     Tests to be done:  - NON-fasting labs in about 2 weeks (hopefully 12/28)  - RN BP check in 2 weeks  - Exercise treadmill stress test  - TTE (heart ultrasound)    Follow up:  - Follow up in about 2 months with cardiology ZACK, or sooner as needed.      For scheduling, please call [phone].      Please contact our team through [email or web address] or our Nurse Team Voicemail service [phone], or the General Clinic [phone] for any questions or concerns.     If you are having a medical emergency, please call [phone].     Sincerely,    Rancho Velazquez MD, [facility]  Cardiology     and Clinics - [facility] - [facility] - [place]

## 2023-12-11 NOTE — PROGRESS NOTES
Cardiology Clinic Consultation:    December 11, 2023   Patient Name: Vinod Hdez  Patient MRN: [id number]    Consult indication: dyspnea on exertion, chest discomfort, HTN    HPI:    I had the opportunity to see patient Vinod Hdez in cardiology clinic for a consultation.     As you know patient is a pleasant 38-year-old male with a past medical history significant for hypertension, longstanding history of dyslipidemia, family history of early ASCVD, who presents for further evaluation and management of chest discomfort and hypertension.    Patient reports intermittent episodes of chest discomfort/pressure, these episodes are nonexertional in nature, and seem to be associated with emotional stress, or when patient feels that he has higher blood pressures.  He was recently seen in the ED for this chest discomfort and shortness of breath 10/10/2023, BP in the ED was 155/105 mmHg.  Laboratory investigation was notable for mildly elevated creatinine at 1.18, high-sensitivity troponin undetectable, NT proBNP undetectable.  D-dimer normal.  ECG without acute ischemic changes.  On inquiry, patient does note that he takes quite a bit of ibuprofen over the course of the week for headaches and musculoskeletal pain.      Patient reports that at baseline he is not as physically active as he would like to be, he does not exercise regularly, though he is active caring for his 4 children.  He does note that in general his diet is suboptimal, he drinks about 2 to 3 ounces of alcohol daily, also endorses eating salty foods.  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  Patient also reports drinking about a pot of coffee daily.  He denies excessive snoring, however does note some daytime somnolence, which he attributes to his obligations and responsibilities caring for his children/family.   Family history notable for his father having CVA in his 50s.  Patient has a longstanding history of dyslipidemia, he is on atorvastatin.    ECG demonstrates normal sinus rhythm, no acute ischemic changes.    Assessment and Plan/Recommendations:    # Dyspnea on exertion, suspect related to poorly controlled hypertension, overall clinical presentation does not seem classically characteristic of myocardial ischemia/angina however he does have risk factors for CAD  # Atypical chest pain  # HTN, poorly controlled  # HL    -Discussed options for further evaluation and management, reviewed pathophysiology of coronary artery disease, risk factors for hypertension  - Will start olmesartan-hydrochlorothiazide, BMP and RN BP check in 2 weeks, will also check a TSH at that time  - Will change atorvastatin to rosuvastatin  - Advised that he cut back on caffeine intake, as well as alcohol  - Discussed importance of decreasing salt in diet  - ECG treadmill stress test  - TTE  - Encouraged increased aerobic exercise, heart healthy Mediterranean type diet  -If blood pressures remain challenging to control, will recommend follow-up with Sleep Medicine for screening for sleep apnea  - Follow-up with cardiology ZACK in about 2 months with fasting lipids and ALT    Thank you for allowing our team to participate in the care of Vinod Hdez.  Please do not hesitate to call or page me with any questions or concerns.    Sincerely,     Rancho Velazquez MD, [facility]  Cardiology  December 11, 2023    Total time spent on this encounter today: Greater than 60 minutes, providing care in this encounter including, but not limited to, reviewing prior medical records, laboratory data, imaging studies, diagnostic studies, procedure notes, formulating an assessment and plan, recommendations, discussion and counseling with patient face to face, dictation.    Past Medical History:     Patient Active Problem List   Diagnosis     Hyperlipidemia    Elevated blood sugar   HTN    Past Surgical History:   Past Surgical History:   Procedure Laterality Date    FLEXIBLE SIGMOIDOSCOPY  2002    hematochezia - negative       Medications (outpatient):  Current Outpatient Medications   Medication Sig Dispense Refill    aspirin 81 MG EC tablet Take 81 mg by mouth daily      olmesartan-hydrochlorothiazide (BENICAR HCT) 20-12.5 MG tablet Take 1 tablet by mouth daily 90 tablet 3    rosuvastatin (CRESTOR) 40 MG tablet Take 1 tablet (40 mg) by mouth at bedtime 90 tablet 3    Omega-3 Fatty Acids (FISH OIL OMEGA-3 PO) Take 1,000 mg by mouth (Patient not taking: Reported on 12/11/2023)         Allergies:  Allergies   Allergen Reactions    No Known Drug Allergy        Social History:   History   Drug Use No      History   Smoking Status    Never   Smokeless Tobacco    Never     Social History    Substance and Sexual Activity      Alcohol use: Yes        Alcohol/week: 10.0 standard drinks of alcohol        Comment: occ       Family History:  Family History   Problem Relation Age of Onset    Heart Disease Father         palpitations    Lipids Father     Diabetes Paternal Grandfather     Genitourinary Problems Mother         kidney stones    Lipids Mother        Review of Systems:   A comprehensive 12 system review of systems was carried out.  Pertinent positives and negatives are noted above. Otherwise negative for contributory information.    Objective & Physical Exam:  BP (!) 130/104   Pulse 89   Ht 1.829 m (6')   Wt 104.6 kg (230 lb 9.6 oz)   SpO2 97%   BMI 31.27 kg/m    Wt Readings from Last 2 Encounters:   12/11/23 104.6 kg (230 lb 9.6 oz)   12/17/21 102.1 kg (225 lb)     Body mass index is 31.27 kg/m .   Body surface area is 2.31 meters squared.    Constitutional: appears stated age, in no apparent distress, appears to be well nourished  Head: normocephalic, atraumatic  Neck: supple, trachea midline, no bruit bilaterally   Pulmonary: clear to  auscultation bilaterally, no wheezes, no rales, no increased work of breathing  Cardiovascular: JVP normal, regular rate, regular rhythm, normal S1 and S2, no S3, S4, no murmur appreciated, no lower extremity edema  Gastrointestinal: no guarding, non-rigid   Neurologic: awake, alert, moves all extremities  Skin: no jaundice, warm on limited exam  Psychiatric: affect is normal, answers questions appropriately, oriented to self and place    Data reviewed:  Lab Results   Component Value Date    WBC 6.7 10/10/2023    WBC 8.2 06/22/2015    RBC 5.06 10/10/2023    RBC 5.06 06/22/2015    HGB 15.3 10/10/2023    HGB 15.2 06/22/2015    HCT 45.3 10/10/2023    HCT 44.9 06/22/2015    MCV 90 10/10/2023    MCV 89 06/22/2015    MCH 30.2 10/10/2023    MCH 30.0 06/22/2015    MCHC 33.8 10/10/2023    MCHC 33.9 06/22/2015    RDW 11.3 10/10/2023    RDW 11.8 06/22/2015     10/10/2023     06/22/2015     Sodium   Date Value Ref Range Status   10/10/2023 142 135 - 145 mmol/L Final     Comment:     Reference intervals for this test were updated on 09/26/2023 to more accurately reflect our healthy population. There may be differences in the flagging of prior results with similar values performed with this method. Interpretation of those prior results can be made in the context of the updated reference intervals.    06/22/2015 140 133 - 144 mmol/L Final     Potassium   Date Value Ref Range Status   10/10/2023 3.9 3.4 - 5.3 mmol/L Final   11/19/2021 3.9 3.4 - 5.3 mmol/L Final     Comment:     Specimen slightly hemolyzed, potassium may be falsely elevated.   06/22/2015 3.8 3.4 - 5.3 mmol/L Final     Chloride   Date Value Ref Range Status   10/10/2023 104 98 - 107 mmol/L Final   11/19/2021 105 94 - 109 mmol/L Final   06/22/2015 102 94 - 109 mmol/L Final     Carbon Dioxide   Date Value Ref Range Status   06/22/2015 30 20 - 32 mmol/L Final     Carbon Dioxide (CO2)   Date Value Ref Range Status   10/10/2023 27 22 - 29 mmol/L Final    11/19/2021 23 20 - 32 mmol/L Final     Anion Gap   Date Value Ref Range Status   10/10/2023 11 7 - 15 mmol/L Final   11/19/2021 6 3 - 14 mmol/L Final   06/22/2015 8 3 - 14 mmol/L Final     Glucose   Date Value Ref Range Status   10/10/2023 98 70 - 99 mg/dL Final   11/19/2021 111 (H) 70 - 99 mg/dL Final   12/08/2016 104 (H) 70 - 99 mg/dL Final     Urea Nitrogen   Date Value Ref Range Status   10/10/2023 16.3 6.0 - 20.0 mg/dL Final   11/19/2021 17 7 - 30 mg/dL Final   06/22/2015 21 7 - 30 mg/dL Final     Creatinine   Date Value Ref Range Status   10/10/2023 1.18 (H) 0.67 - 1.17 mg/dL Final   06/22/2015 1.02 0.66 - 1.25 mg/dL Final     GFR Estimate   Date Value Ref Range Status   10/10/2023 80 >60 mL/min/1.73m2 Final   06/22/2015 85 >60 mL/min/1.7m2 Final     Comment:     Non  GFR Calc     Calcium   Date Value Ref Range Status   10/10/2023 10.1 (H) 8.6 - 10.0 mg/dL Final   06/22/2015 9.0 8.5 - 10.1 mg/dL Final     Bilirubin Total   Date Value Ref Range Status   10/10/2023 0.6 <=1.2 mg/dL Final   06/22/2015 0.7 0.2 - 1.3 mg/dL Final     Alkaline Phosphatase   Date Value Ref Range Status   10/10/2023 40 40 - 129 U/L Final   06/22/2015 41 40 - 150 U/L Final     ALT   Date Value Ref Range Status   10/10/2023 55 0 - 70 U/L Final     Comment:     Reference intervals for this test were updated on 6/12/2023 to more accurately reflect our healthy population. There may be differences in the flagging of prior results with similar values performed with this method. Interpretation of those prior results can be made in the context of the updated reference intervals.     06/22/2015 67 0 - 70 U/L Final     AST   Date Value Ref Range Status   10/10/2023 28 0 - 45 U/L Final     Comment:     Reference intervals for this test were updated on 6/12/2023 to more accurately reflect our healthy population. There may be differences in the flagging of prior results with similar values performed with this method. Interpretation  of those prior results can be made in the context of the updated reference intervals.   06/22/2015 33 0 - 45 U/L Final     Recent Labs   Lab Test 03/13/18  0833 12/08/16  0815   CHOL 221* 276*   HDL 27* 34*   * 199*   TRIG 300* 216*      Lab Results   Component Value Date    A1C 5.5 03/13/2018

## 2023-12-11 NOTE — LETTER
12/11/2023    Physician No Ref-Primary  No address on file    RE: Vinod Hdez       Dear Colleague,     I had the pleasure of seeing Vinod Hdez in the [facility].      Cardiology Clinic Consultation:    December 11, 2023   Patient Name: Vinod Hdez  Patient MRN: [id number]    Consult indication: dyspnea on exertion, chest discomfort, HTN    HPI:    I had the opportunity to see patient Vinod Hdez in cardiology clinic for a consultation.     As you know patient is a pleasant 38-year-old male with a past medical history significant for hypertension, longstanding history of dyslipidemia, family history of early ASCVD, who presents for further evaluation and management of chest discomfort and hypertension.    Patient reports intermittent episodes of chest discomfort/pressure, these episodes are nonexertional in nature, and seem to be associated with emotional stress, or when patient feels that he has higher blood pressures.  He was recently seen in the ED for this chest discomfort and shortness of breath 10/10/2023, BP in the ED was 155/105 mmHg.  Laboratory investigation was notable for mildly elevated creatinine at 1.18, high-sensitivity troponin undetectable, NT proBNP undetectable.  D-dimer normal.  ECG without acute ischemic changes.  On inquiry, patient does note that he takes quite a bit of ibuprofen over the course of the week for headaches and musculoskeletal pain.      Patient reports that at baseline he is not as physically active as he would like to be, he does not exercise regularly, though he is active caring for his 4 children.  He does note that in general his diet is suboptimal, he drinks about 2 to 3 ounces of alcohol daily, also endorses eating salty foods.  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  Patient also reports  drinking about a pot of coffee daily.  He denies excessive snoring, however does note some daytime somnolence, which he attributes to his obligations and responsibilities caring for his children/family.  Family history notable for his father having CVA in his 50s.  Patient has a longstanding history of dyslipidemia, he is on atorvastatin.    ECG demonstrates normal sinus rhythm, no acute ischemic changes.    Assessment and Plan/Recommendations:    # Dyspnea on exertion, suspect related to poorly controlled hypertension, overall clinical presentation does not seem classically characteristic of myocardial ischemia/angina however he does have risk factors for CAD  # Atypical chest pain  # HTN, poorly controlled  # HL    -Discussed options for further evaluation and management, reviewed pathophysiology of coronary artery disease, risk factors for hypertension  - Will start olmesartan-hydrochlorothiazide, BMP and RN BP check in 2 weeks, will also check a TSH at that time  - Will change atorvastatin to rosuvastatin  - Advised that he cut back on caffeine intake, as well as alcohol  - Discussed importance of decreasing salt in diet  - ECG treadmill stress test  - TTE  - Encouraged increased aerobic exercise, heart healthy Mediterranean type diet  -If blood pressures remain challenging to control, will recommend follow-up with Sleep Medicine for screening for sleep apnea  - Follow-up with cardiology [facility] in about 2 months with fasting lipids and ALT    Thank you for allowing our team to participate in the care of Vinod Hdez.  Please do not hesitate to call or page me with any questions or concerns.    Sincerely,     Rancho Velazquez MD, [facility]  Cardiology  December 11, 2023    Total time spent on this encounter today: Greater than 60 minutes, providing care in this encounter including, but not limited to, reviewing prior medical records, laboratory data, imaging studies, diagnostic studies, procedure  notes, formulating an assessment and plan, recommendations, discussion and counseling with patient face to face, dictation.    Past Medical History:     Patient Active Problem List   Diagnosis    Hyperlipidemia    Elevated blood sugar   HTN    Past Surgical History:   Past Surgical History:   Procedure Laterality Date    FLEXIBLE SIGMOIDOSCOPY  2002    hematochezia - negative       Medications (outpatient):  Current Outpatient Medications   Medication Sig Dispense Refill    aspirin 81 MG EC tablet Take 81 mg by mouth daily      olmesartan-hydrochlorothiazide (BENICAR HCT) 20-12.5 MG tablet Take 1 tablet by mouth daily 90 tablet 3    rosuvastatin (CRESTOR) 40 MG tablet Take 1 tablet (40 mg) by mouth at bedtime 90 tablet 3    Omega-3 Fatty Acids (FISH OIL OMEGA-3 PO) Take 1,000 mg by mouth (Patient not taking: Reported on 12/11/2023)         Allergies:  Allergies   Allergen Reactions    No Known Drug Allergy        Social History:   History   Drug Use No      History   Smoking Status    Never   Smokeless Tobacco    Never     Social History    Substance and Sexual Activity      Alcohol use: Yes        Alcohol/week: 10.0 standard drinks of alcohol        Comment: occ       Family History:  Family History   Problem Relation Age of Onset    Heart Disease Father         palpitations    Lipids Father     Diabetes Paternal Grandfather     Genitourinary Problems Mother         kidney stones    Lipids Mother        Review of Systems:   A comprehensive 12 system review of systems was carried out.  Pertinent positives and negatives are noted above. Otherwise negative for contributory information.    Objective & Physical Exam:  BP (!) 130/104   Pulse 89   Ht 1.829 m (6')   Wt 104.6 kg (230 lb 9.6 oz)   SpO2 97%   BMI 31.27 kg/m    Wt Readings from Last 2 Encounters:   12/11/23 104.6 kg (230 lb 9.6 oz)   12/17/21 102.1 kg (225 lb)     Body mass index is 31.27 kg/m .   Body surface area is 2.31 meters  squared.    Constitutional: appears stated age, in no apparent distress, appears to be well nourished  Head: normocephalic, atraumatic  Neck: supple, trachea midline, no bruit bilaterally   Pulmonary: clear to auscultation bilaterally, no wheezes, no rales, no increased work of breathing  Cardiovascular: JVP normal, regular rate, regular rhythm, normal S1 and S2, no S3, S4, no murmur appreciated, no lower extremity edema  Gastrointestinal: no guarding, non-rigid   Neurologic: awake, alert, moves all extremities  Skin: no jaundice, warm on limited exam  Psychiatric: affect is normal, answers questions appropriately, oriented to self and place    Data reviewed:  Lab Results   Component Value Date    WBC 6.7 10/10/2023    WBC 8.2 06/22/2015    RBC 5.06 10/10/2023    RBC 5.06 06/22/2015    HGB 15.3 10/10/2023    HGB 15.2 06/22/2015    HCT 45.3 10/10/2023    HCT 44.9 06/22/2015    MCV 90 10/10/2023    MCV 89 06/22/2015    MCH 30.2 10/10/2023    MCH 30.0 06/22/2015    MCHC 33.8 10/10/2023    MCHC 33.9 06/22/2015    RDW 11.3 10/10/2023    RDW 11.8 06/22/2015     10/10/2023     06/22/2015     Sodium   Date Value Ref Range Status   10/10/2023 142 135 - 145 mmol/L Final     Comment:     Reference intervals for this test were updated on 09/26/2023 to more accurately reflect our healthy population. There may be differences in the flagging of prior results with similar values performed with this method. Interpretation of those prior results can be made in the context of the updated reference intervals.    06/22/2015 140 133 - 144 mmol/L Final     Potassium   Date Value Ref Range Status   10/10/2023 3.9 3.4 - 5.3 mmol/L Final   11/19/2021 3.9 3.4 - 5.3 mmol/L Final     Comment:     Specimen slightly hemolyzed, potassium may be falsely elevated.   06/22/2015 3.8 3.4 - 5.3 mmol/L Final     Chloride   Date Value Ref Range Status   10/10/2023 104 98 - 107 mmol/L Final   11/19/2021 105 94 - 109 mmol/L Final   06/22/2015  102 94 - 109 mmol/L Final     Carbon Dioxide   Date Value Ref Range Status   06/22/2015 30 20 - 32 mmol/L Final     Carbon Dioxide (CO2)   Date Value Ref Range Status   10/10/2023 27 22 - 29 mmol/L Final   11/19/2021 23 20 - 32 mmol/L Final     Anion Gap   Date Value Ref Range Status   10/10/2023 11 7 - 15 mmol/L Final   11/19/2021 6 3 - 14 mmol/L Final   06/22/2015 8 3 - 14 mmol/L Final     Glucose   Date Value Ref Range Status   10/10/2023 98 70 - 99 mg/dL Final   11/19/2021 111 (H) 70 - 99 mg/dL Final   12/08/2016 104 (H) 70 - 99 mg/dL Final     Urea Nitrogen   Date Value Ref Range Status   10/10/2023 16.3 6.0 - 20.0 mg/dL Final   11/19/2021 17 7 - 30 mg/dL Final   06/22/2015 21 7 - 30 mg/dL Final     Creatinine   Date Value Ref Range Status   10/10/2023 1.18 (H) 0.67 - 1.17 mg/dL Final   06/22/2015 1.02 0.66 - 1.25 mg/dL Final     GFR Estimate   Date Value Ref Range Status   10/10/2023 80 >60 mL/min/1.73m2 Final   06/22/2015 85 >60 mL/min/1.7m2 Final     Comment:     Non  GFR Calc     Calcium   Date Value Ref Range Status   10/10/2023 10.1 (H) 8.6 - 10.0 mg/dL Final   06/22/2015 9.0 8.5 - 10.1 mg/dL Final     Bilirubin Total   Date Value Ref Range Status   10/10/2023 0.6 <=1.2 mg/dL Final   06/22/2015 0.7 0.2 - 1.3 mg/dL Final     Alkaline Phosphatase   Date Value Ref Range Status   10/10/2023 40 40 - 129 U/L Final   06/22/2015 41 40 - 150 U/L Final     ALT   Date Value Ref Range Status   10/10/2023 55 0 - 70 U/L Final     Comment:     Reference intervals for this test were updated on 6/12/2023 to more accurately reflect our healthy population. There may be differences in the flagging of prior results with similar values performed with this method. Interpretation of those prior results can be made in the context of the updated reference intervals.     06/22/2015 67 0 - 70 U/L Final     AST   Date Value Ref Range Status   10/10/2023 28 0 - 45 U/L Final     Comment:     Reference intervals for this  test were updated on 6/12/2023 to more accurately reflect our healthy population. There may be differences in the flagging of prior results with similar values performed with this method. Interpretation of those prior results can be made in the context of the updated reference intervals.   06/22/2015 33 0 - 45 U/L Final     Recent Labs   Lab Test 03/13/18  0833 12/08/16  0815   CHOL 221* 276*   HDL 27* 34*   * 199*   TRIG 300* 216*      Lab Results   Component Value Date    A1C 5.5 03/13/2018        Thank you for allowing me to participate in the care of your patient.      Sincerely,     Rancho Velazquez MD     [facility]  cc:   Referred Self,

## 2023-12-28 ENCOUNTER — HOSPITAL ENCOUNTER (OUTPATIENT)
Dept: CARDIOLOGY | Facility: CLINIC | Age: 38
Discharge: HOME | End: 2023-12-28
Attending: INTERNAL MEDICINE | Admitting: INTERNAL MEDICINE
Payer: COMMERCIAL

## 2023-12-28 DIAGNOSIS — R06.09 DYSPNEA ON EXERTION: ICD-10-CM

## 2023-12-28 DIAGNOSIS — R07.89 ATYPICAL CHEST PAIN: ICD-10-CM

## 2023-12-28 DIAGNOSIS — I10 BENIGN ESSENTIAL HYPERTENSION: ICD-10-CM

## 2023-12-28 PROCEDURE — 93016 CV STRESS TEST SUPVJ ONLY: CPT | Performed by: INTERNAL MEDICINE

## 2023-12-28 PROCEDURE — 93018 CV STRESS TEST I&R ONLY: CPT | Performed by: INTERNAL MEDICINE

## 2023-12-28 PROCEDURE — 93017 CV STRESS TEST TRACING ONLY: CPT

## 2024-01-02 ENCOUNTER — LAB (OUTPATIENT)
Dept: LAB | Facility: CLINIC | Age: 39
End: 2024-01-02
Payer: COMMERCIAL

## 2024-01-02 ENCOUNTER — DOCUMENTATION ONLY (OUTPATIENT)
Dept: CARDIOLOGY | Facility: CLINIC | Age: 39
End: 2024-01-02

## 2024-01-02 ENCOUNTER — HOSPITAL ENCOUNTER (OUTPATIENT)
Dept: CARDIOLOGY | Facility: CLINIC | Age: 39
Discharge: HOME | End: 2024-01-02
Attending: INTERNAL MEDICINE | Admitting: INTERNAL MEDICINE
Payer: COMMERCIAL

## 2024-01-02 ENCOUNTER — ALLIED HEALTH/NURSE VISIT (OUTPATIENT)
Dept: CARDIOLOGY | Facility: CLINIC | Age: 39
End: 2024-01-02
Attending: INTERNAL MEDICINE
Payer: COMMERCIAL

## 2024-01-02 VITALS — HEART RATE: 84 BPM | OXYGEN SATURATION: 96 % | DIASTOLIC BLOOD PRESSURE: 77 MMHG | SYSTOLIC BLOOD PRESSURE: 109 MMHG

## 2024-01-02 DIAGNOSIS — R06.09 DYSPNEA ON EXERTION: ICD-10-CM

## 2024-01-02 DIAGNOSIS — I10 BENIGN ESSENTIAL HYPERTENSION: ICD-10-CM

## 2024-01-02 DIAGNOSIS — R07.89 ATYPICAL CHEST PAIN: ICD-10-CM

## 2024-01-02 LAB
ANION GAP SERPL CALCULATED.3IONS-SCNC: 10 MMOL/L (ref 7–15)
BUN SERPL-MCNC: 19.4 MG/DL (ref 6–20)
CALCIUM SERPL-MCNC: 9.6 MG/DL (ref 8.6–10)
CHLORIDE SERPL-SCNC: 101 MMOL/L (ref 98–107)
CREAT SERPL-MCNC: 0.96 MG/DL (ref 0.67–1.17)
DEPRECATED HCO3 PLAS-SCNC: 27 MMOL/L (ref 22–29)
EGFRCR SERPLBLD CKD-EPI 2021: >90 ML/MIN/1.73M2
GLUCOSE SERPL-MCNC: 96 MG/DL (ref 70–99)
LVEF ECHO: NORMAL
POTASSIUM SERPL-SCNC: 3.7 MMOL/L (ref 3.4–5.3)
SODIUM SERPL-SCNC: 138 MMOL/L (ref 135–145)
TSH SERPL DL<=0.005 MIU/L-ACNC: 1.75 UIU/ML (ref 0.3–4.2)

## 2024-01-02 PROCEDURE — 36415 COLL VENOUS BLD VENIPUNCTURE: CPT | Performed by: INTERNAL MEDICINE

## 2024-01-02 PROCEDURE — 99207 PR NO CHARGE LOS: CPT

## 2024-01-02 PROCEDURE — 84443 ASSAY THYROID STIM HORMONE: CPT | Performed by: INTERNAL MEDICINE

## 2024-01-02 PROCEDURE — 93306 TTE W/DOPPLER COMPLETE: CPT

## 2024-01-02 PROCEDURE — 80048 BASIC METABOLIC PNL TOTAL CA: CPT | Performed by: INTERNAL MEDICINE

## 2024-01-02 PROCEDURE — 93306 TTE W/DOPPLER COMPLETE: CPT | Mod: 26 | Performed by: INTERNAL MEDICINE

## 2024-01-02 NOTE — RESULT ENCOUNTER NOTE
Overall results are favorable, the cardiac stress test was negative, consistent with a low likelihood of obstructive coronary artery disease.

## 2024-01-02 NOTE — PROGRESS NOTES
ALLIED [facility]ALTH BLOOD PRESSURE CHECK     Last office visit: 12/11/2023    Previous blood pressure: 130/104 mm Hg  Previous heart rate: 89 bpm      Time of visit: 9:18 am    Morning medications were taken at: No, last night      Today's blood pressure: 109/77 mm Hg  Today's heart rate: 84 bpm     Home monitor blood pressure:  mmHg  Home monitor heart rate:  bpm      Additional Comments: Usually takes his medication at night.       Results routed to: Team 2      Ordering Provider: Dr. Velazquez  In clinic Provider: Dr. Dubois

## 2024-01-03 ENCOUNTER — TELEPHONE (OUTPATIENT)
Dept: CARDIOLOGY | Facility: CLINIC | Age: 39
End: 2024-01-03
Payer: COMMERCIAL

## 2024-01-03 NOTE — RESULT ENCOUNTER NOTE
Results reviewed, please let the patient know that overall findings are reassuring, blood pressure is better controlled and labs demonstrate normal electrolytes and renal function.

## 2024-01-03 NOTE — TELEPHONE ENCOUNTER
Regarding echo-   ----- Message from Rancho Velazquez MD sent at 1/3/2024  7:48 AM CST -----  Results reviewed, please let the patient know that overall findings are reassuring, normal cardiac structure and function.  Recent stress test was reassuring, has scheduled follow-up with [facility], if stable at that time, recommend he see cardiology as needed in the future.      Regarding labs-   Rancho Velazquez MD  [facility] Team 2  Results reviewed, please let the patient know that overall findings are reassuring, blood pressure is better controlled and labs demonstrate normal electrolytes and renal function.      Spoke to patient and reviewed test results. He has no questions at this time.

## 2024-01-03 NOTE — RESULT ENCOUNTER NOTE
Results reviewed, please let the patient know that overall findings are reassuring, normal cardiac structure and function.  Recent stress test was reassuring, has scheduled follow-up with [facility], if stable at that time, recommend he see cardiology as needed in the future.

## 2024-01-18 ENCOUNTER — LAB (OUTPATIENT)
Dept: LAB | Facility: CLINIC | Age: 39
End: 2024-01-18
Payer: COMMERCIAL

## 2024-01-18 DIAGNOSIS — E78.5 DYSLIPIDEMIA: ICD-10-CM

## 2024-01-18 LAB
ALT SERPL W P-5'-P-CCNC: 49 U/L (ref 0–70)
CHOLEST SERPL-MCNC: 142 MG/DL
FASTING STATUS PATIENT QL REPORTED: YES
HDLC SERPL-MCNC: 34 MG/DL
LDLC SERPL CALC-MCNC: 81 MG/DL
NONHDLC SERPL-MCNC: 108 MG/DL
TRIGL SERPL-MCNC: 136 MG/DL

## 2024-01-18 PROCEDURE — 84460 ALANINE AMINO (ALT) (SGPT): CPT | Performed by: INTERNAL MEDICINE

## 2024-01-18 PROCEDURE — 36415 COLL VENOUS BLD VENIPUNCTURE: CPT | Performed by: INTERNAL MEDICINE

## 2024-01-18 PROCEDURE — 80061 LIPID PANEL: CPT | Performed by: INTERNAL MEDICINE

## 2024-01-19 NOTE — RESULT ENCOUNTER NOTE
Results reviewed, please let the patient know that overall findings are reassuring, lipids have responded favorably, liver test normal.   Follow up as previously planned, thanks!

## 2024-02-14 ENCOUNTER — OFFICE VISIT (OUTPATIENT)
Dept: CARDIOLOGY | Facility: CLINIC | Age: 39
End: 2024-02-14
Attending: INTERNAL MEDICINE
Payer: COMMERCIAL

## 2024-02-14 VITALS
DIASTOLIC BLOOD PRESSURE: 72 MMHG | WEIGHT: 227 LBS | BODY MASS INDEX: 30.75 KG/M2 | HEART RATE: 74 BPM | SYSTOLIC BLOOD PRESSURE: 112 MMHG | HEIGHT: 72 IN | OXYGEN SATURATION: 97 %

## 2024-02-14 DIAGNOSIS — R07.89 ATYPICAL CHEST PAIN: ICD-10-CM

## 2024-02-14 DIAGNOSIS — R06.09 DYSPNEA ON EXERTION: ICD-10-CM

## 2024-02-14 PROCEDURE — 99214 OFFICE O/P EST MOD 30 MIN: CPT | Performed by: PHYSICIAN ASSISTANT

## 2024-02-14 NOTE — PROGRESS NOTES
CARDIOLOGY CLINIC PROGRESS NOTE    DOS: 2024      Vinod Sowluís  : 1985, 38 year old  MRN: [id number]      Primary cardiologist: Dr. Velazquez      History: I am meeting Vinod Hdez today for follow up.  He is a 38 year old male with history of hypertension, longstanding history of dyslipidemia, family history of early ASCVD in his father and brother. Family history notable for his father having CVA in his 50s.     23 he met Dr. Velazquez re intermittent episodes of chest discomfort and management of HTN.   He switched atorvastatin to rosuvastatin 40 mg. He started olmesartan-Hydrochlorothiazide 20-12.5 mg/daily. Stress EKG and echo were ordered.     23 exercise stress test: he had no chest pain or ischemic ECG changes during exercise. The Duke treadmill score was low risk ( >5 Duke score) suggesting low likelihood of obstructive CAD.    24 BP check with improved /77.  BMP was stable.     24 echo: LVEF 60-65%. Left ventricular systolic function is normal.    24 FLP: , HDL 34, LDL 81, .       He presents today for follow up.   He has been doing well since he saw Dr. Velazquez. BP has better controlled. No episodes of chest discomfort.   He has cut down on salt.   He says his FLP is the best it has been since he was 16 years old.           ROS:  Skin:  Negative     Eyes:  Negative    ENT:  Negative    Respiratory:  Negative    Cardiovascular:  Negative    Gastroenterology: Negative    Genitourinary:  Negative    Musculoskeletal:  Negative    Neurologic:  Negative    Psychiatric:  Negative    Heme/Lymph/Imm:  Negative    Endocrine:  Negative      PAST MEDICAL HISTORY:  Past Medical History:   Diagnosis Date    NO ACTIVE PROBLEMS        PAST SURGICAL HISTORY:  Past Surgical History:   Procedure Laterality Date    FLEXIBLE SIGMOIDOSCOPY  2002    hematochezia - negative       SOCIAL HISTORY:  Social History     Socioeconomic History    Marital status:      Number of children: 0   Occupational History     Employer: [employer]   Tobacco Use    Smoking status: Never    Smokeless tobacco: Never   Vaping Use    Vaping Use: Never used   Substance and Sexual Activity    Alcohol use: Yes     Alcohol/week: 10.0 standard drinks of alcohol     Comment: occ    Drug use: No    Sexual activity: Yes     Partners: Female   Other Topics Concern    Caffeine Concern No    Occupational Exposure No    Hobby Hazards No    Sleep Concern No    Stress Concern No    Weight Concern No    Special Diet No    Exercise Yes    Seat Belt Yes    Parent/sibling w/ CABG, MI or angioplasty before 65F 55M? Yes     Comment: heart disease age 47       FAMILY HISTORY:  Family History   Problem Relation Age of Onset    Heart Disease Father         palpitations    Lipids Father     Diabetes Paternal Grandfather     Genitourinary Problems Mother         kidney stones    Lipids Mother        MEDS: olmesartan-hydrochlorothiazide (BENICAR HCT) 20-12.5 MG tablet, Take 1 tablet by mouth daily  rosuvastatin (CRESTOR) 40 MG tablet, Take 1 tablet (40 mg) by mouth at bedtime    No current facility-administered medications on file prior to visit.      ALLERGIES:   Allergies   Allergen Reactions    No Known Drug Allergy        PHYSICAL EXAM:  Vitals: /72   Pulse 74   Ht 1.829 m (6')   Wt 103 kg (227 lb)   SpO2 97%   BMI 30.79 kg/m    Constitutional:  cooperative, alert and oriented, well developed, well nourished, in no acute distress        Skin:  warm and dry to the touch, no apparent skin lesions or masses noted        Head:  normocephalic, no masses or lesions        Eyes:  pupils equal and round;conjunctivae and lids unremarkable;sclera white        ENT:  no pallor or cyanosis, dentition good        Neck:  JVP normal        Respiratory:  clear to auscultation        Cardiac: regular rhythm, normal S1/S2, no S3 or S4, apical impulse not displaced, no murmurs, gallops or rubs                  GI:  abdomen  soft;BS normoactive        Vascular:                                        Extremities and Musculoskeletal:  no deformities, clubbing, cyanosis, erythema observed;no edema;no spinal abnormalities noted;normal muscle strength and tone        Neurological:  no gross motor deficits;affect appropriate            LABS/DATA:  I reviewed the following:  Component      Latest Ref Rng 1/2/2024  9:14 AM 1/18/2024  7:51 AM   Sodium      135 - 145 mmol/L 138     Potassium      3.4 - 5.3 mmol/L 3.7     Chloride      98 - 107 mmol/L 101     Carbon Dioxide (CO2)      22 - 29 mmol/L 27     Anion Gap      7 - 15 mmol/L 10     Urea Nitrogen      6.0 - 20.0 mg/dL 19.4     Creatinine      0.67 - 1.17 mg/dL 0.96     GFR Estimate      >60 mL/min/1.73m2 >90     Calcium      8.6 - 10.0 mg/dL 9.6     Glucose      70 - 99 mg/dL 96     Cholesterol      <200 mg/dL  142    Triglycerides      <150 mg/dL  136    HDL Cholesterol      >=40 mg/dL  34 (L)    LDL Cholesterol Calculated      <=100 mg/dL  81    Non HDL Cholesterol      <130 mg/dL  108    Patient Fasting?  Yes    TSH      0.30 - 4.20 uIU/mL 1.75     ALT      0 - 70 U/L  49       Legend:  (L) Low        1/2/24 echo:  Interpretation Summary  The visual ejection fraction is 60-65%.  Left ventricular systolic function is normal.  Diastolic Doppler findings (E/E' ratio and/or other parameters) suggest left  ventricular filling pressures are normal.          12/28/23 stress EKG:  Interpretation Summary  The patient exhibited no chest pain or ischemic ECG changes during exercise.  The Duke treadmill score was low risk ( >5 Duke score) suggesting low  likelihood of obstructive CAD.              ASSESSMENT/PLAN:  # Dyspnea on exertion, suspect related to poorly controlled hypertension. This is improved with better BP control.   Stress EKG is negative for ischemic changes.   Echo shows normal LV function and no sig valve disease.     # Atypical chest pain  Improved with better BP control.     #  HTN, now controlled on olmesartan-hydrochlorothiazide  BMP is stable  TSH WNL  Additionally, he is working to cut down on sodium in his diet and be more active    # HL, on Crestor 40 mg  Additionally, he is working on his diet and to be more active  LDL 81, HDL a little low at 34        Continue current meds.   Continue efforts at increasing exercise, eating a heart healthy diet.   See cardiology PRN going forward.       Betzaida Ceron PA-C

## 2024-02-14 NOTE — LETTER
2024    Physician No Ref-Primary  No address on file    RE: Vinod Sowluís       Dear Colleague,     I had the pleasure of seeing Vinod Hdez in the [facility].      CARDIOLOGY CLINIC PROGRESS NOTE    DOS: 2024      Vinod Hdez  : 1985, 38 year old  MRN: [id number]      Primary cardiologist: Dr. Velazquez      History: I am meeting Vinod Pierresue today for follow up.  He is a 38 year old male with history of hypertension, longstanding history of dyslipidemia, family history of early ASCVD in his father and brother. Family history notable for his father having CVA in his 50s.     23 he met Dr. Velazquez re intermittent episodes of chest discomfort and management of HTN.   He switched atorvastatin to rosuvastatin 40 mg. He started olmesartan-Hydrochlorothiazide 20-12.5 mg/daily. Stress EKG and echo were ordered.     23 exercise stress test: he had no chest pain or ischemic ECG changes during exercise. The Duke treadmill score was low risk ( >5 Duke score) suggesting low likelihood of obstructive CAD.    24 BP check with improved /77.  BMP was stable.     24 echo: LVEF 60-65%. Left ventricular systolic function is normal.    24 FLP: , HDL 34, LDL 81, .       He presents today for follow up.   He has been doing well since he saw Dr. Velazquez. BP has better controlled. No episodes of chest discomfort.   He has cut down on salt.   He says his FLP is the best it has been since he was 16 years old.           ROS:  Skin:  Negative     Eyes:  Negative    ENT:  Negative    Respiratory:  Negative    Cardiovascular:  Negative    Gastroenterology: Negative    Genitourinary:  Negative    Musculoskeletal:  Negative    Neurologic:  Negative    Psychiatric:  Negative    Heme/Lymph/Imm:  Negative    Endocrine:  Negative      PAST MEDICAL HISTORY:  Past Medical History:   Diagnosis Date    NO ACTIVE PROBLEMS        PAST SURGICAL HISTORY:  Past  Surgical History:   Procedure Laterality Date    FLEXIBLE SIGMOIDOSCOPY  2002    hematochezia - negative       SOCIAL HISTORY:  Social History     Socioeconomic History    Marital status:     Number of children: 0   Occupational History     Employer: [employer]   Tobacco Use    Smoking status: Never    Smokeless tobacco: Never   Vaping Use    Vaping Use: Never used   Substance and Sexual Activity    Alcohol use: Yes     Alcohol/week: 10.0 standard drinks of alcohol     Comment: occ    Drug use: No    Sexual activity: Yes     Partners: Female   Other Topics Concern    Caffeine Concern No    Occupational Exposure No    Hobby Hazards No    Sleep Concern No    Stress Concern No    Weight Concern No    Special Diet No    Exercise Yes    Seat Belt Yes    Parent/sibling w/ CABG, MI or angioplasty before 65F 55M? Yes     Comment: heart disease age 47       FAMILY HISTORY:  Family History   Problem Relation Age of Onset    Heart Disease Father         palpitations    Lipids Father     Diabetes Paternal Grandfather     Genitourinary Problems Mother         kidney stones    Lipids Mother        MEDS: olmesartan-hydrochlorothiazide (BENICAR HCT) 20-12.5 MG tablet, Take 1 tablet by mouth daily  rosuvastatin (CRESTOR) 40 MG tablet, Take 1 tablet (40 mg) by mouth at bedtime    No current facility-administered medications on file prior to visit.      ALLERGIES:   Allergies   Allergen Reactions    No Known Drug Allergy        PHYSICAL EXAM:  Vitals: /72   Pulse 74   Ht 1.829 m (6')   Wt 103 kg (227 lb)   SpO2 97%   BMI 30.79 kg/m    Constitutional:  cooperative, alert and oriented, well developed, well nourished, in no acute distress        Skin:  warm and dry to the touch, no apparent skin lesions or masses noted        Head:  normocephalic, no masses or lesions        Eyes:  pupils equal and round;conjunctivae and lids unremarkable;sclera white        ENT:  no pallor or cyanosis, dentition good        Neck:  JVP  normal        Respiratory:  clear to auscultation        Cardiac: regular rhythm, normal S1/S2, no S3 or S4, apical impulse not displaced, no murmurs, gallops or rubs                  GI:  abdomen soft;BS normoactive        Vascular:                                        Extremities and Musculoskeletal:  no deformities, clubbing, cyanosis, erythema observed;no edema;no spinal abnormalities noted;normal muscle strength and tone        Neurological:  no gross motor deficits;affect appropriate            LABS/DATA:  I reviewed the following:  Component      Latest Ref Rng 1/2/2024  9:14 AM 1/18/2024  7:51 AM   Sodium      135 - 145 mmol/L 138     Potassium      3.4 - 5.3 mmol/L 3.7     Chloride      98 - 107 mmol/L 101     Carbon Dioxide (CO2)      22 - 29 mmol/L 27     Anion Gap      7 - 15 mmol/L 10     Urea Nitrogen      6.0 - 20.0 mg/dL 19.4     Creatinine      0.67 - 1.17 mg/dL 0.96     GFR Estimate      >60 mL/min/1.73m2 >90     Calcium      8.6 - 10.0 mg/dL 9.6     Glucose      70 - 99 mg/dL 96     Cholesterol      <200 mg/dL  142    Triglycerides      <150 mg/dL  136    HDL Cholesterol      >=40 mg/dL  34 (L)    LDL Cholesterol Calculated      <=100 mg/dL  81    Non HDL Cholesterol      <130 mg/dL  108    Patient Fasting?  Yes    TSH      0.30 - 4.20 uIU/mL 1.75     ALT      0 - 70 U/L  49       Legend:  (L) Low        1/2/24 echo:  Interpretation Summary  The visual ejection fraction is 60-65%.  Left ventricular systolic function is normal.  Diastolic Doppler findings (E/E' ratio and/or other parameters) suggest left  ventricular filling pressures are normal.          12/28/23 stress EKG:  Interpretation Summary  The patient exhibited no chest pain or ischemic ECG changes during exercise.  The Duke treadmill score was low risk ( >5 Duke score) suggesting low  likelihood of obstructive CAD.    ASSESSMENT/PLAN:  # Dyspnea on exertion, suspect related to poorly controlled hypertension. This is improved with  better BP control.   Stress EKG is negative for ischemic changes.   Echo shows normal LV function and no sig valve disease.     # Atypical chest pain  Improved with better BP control.     # HTN, now controlled on olmesartan-hydrochlorothiazide  BMP is stable  TSH WNL  Additionally, he is working to cut down on sodium in his diet and be more active    # HL, on Crestor 40 mg  Additionally, he is working on his diet and to be more active  LDL 81, HDL a little low at 34        Continue current meds.   Continue efforts at increasing exercise, eating a heart healthy diet.   See cardiology PRN going forward.       Betzaida Ceron PA-C      Thank you for allowing me to participate in the care of your patient.      Sincerely,     Betzaida Ceron PA-C     [facility]  cc:   Rancho Velazquez MD  [address], [address]  [place] [address]

## 2024-02-14 NOTE — PATIENT INSTRUCTIONS
On your current medications, your risk factors of blood pressure and cholesterol are well controlled.   Continue on these medications.   Continue to work on a heart healthy diet and getting more exercise.     Should you have recurrence of chest discomfort, especially with normal blood pressure or with exertion, please let us know.     You can see cardiology as needed going forward.

## 2024-09-07 ENCOUNTER — HEALTH MAINTENANCE LETTER (OUTPATIENT)
Age: 39
End: 2024-09-07

## 2025-01-16 DIAGNOSIS — E78.5 DYSLIPIDEMIA: ICD-10-CM

## 2025-01-16 RX ORDER — ROSUVASTATIN CALCIUM 40 MG/1
40 TABLET, COATED ORAL AT BEDTIME
Qty: 90 TABLET | Refills: 0 | Status: SHIPPED | OUTPATIENT
Start: 2025-01-16

## 2025-01-16 NOTE — CONFIDENTIAL NOTE
[facility] Refill Guideline reviewed.  Medication meets criteria for refill. Refill request from [facility] in [place], [place]. Patient is out of refills given a year's supply on 12/11/23. Patient was last seen 2/14/24 with Betzaida Ceron with orders to follow-up prn. Courtesy fill given to get patient to a year since last seen.

## 2025-01-20 DIAGNOSIS — I10 BENIGN ESSENTIAL HYPERTENSION: ICD-10-CM

## 2025-01-20 RX ORDER — OLMESARTAN MEDOXOMIL AND HYDROCHLOROTHIAZIDE 20/12.5 20; 12.5 MG/1; MG/1
1 TABLET ORAL DAILY
Qty: 90 TABLET | Refills: 0 | Status: SHIPPED | OUTPATIENT
Start: 2025-01-20

## 2025-07-15 ENCOUNTER — TELEPHONE (OUTPATIENT)
Dept: CARDIOLOGY | Facility: CLINIC | Age: 40
End: 2025-07-15
Payer: COMMERCIAL

## 2025-07-15 DIAGNOSIS — E78.5 DYSLIPIDEMIA: ICD-10-CM

## 2025-07-15 RX ORDER — ROSUVASTATIN CALCIUM 40 MG/1
40 TABLET, COATED ORAL AT BEDTIME
Qty: 90 TABLET | Refills: 0 | Status: SHIPPED | OUTPATIENT
Start: 2025-07-15

## 2025-07-15 NOTE — TELEPHONE ENCOUNTER
Rx sent to [facility] in [place]. Will send MyChart message to patient, informing him that he can request a transfer to [facility] from there.

## 2025-07-15 NOTE — TELEPHONE ENCOUNTER
[facility] Call Center    Phone Message    May a detailed message be left on voicemail: yes     Reason for Call: Medication Refill Request    Has the patient contacted the pharmacy for the refill? Yes   Name of medication being requested: rosuvastatin (CRESTOR) 40 MG tablet   Provider who prescribed the medication: Betzaida Ceron  Pharmacy: [facility]/PHARMACY  [address] [place]     Date medication is needed: 07/15/25   Patient  has moved to [place] and wondering if he can get a 60 day supply until he finds a provider.     Action Taken: Other: cardiology    Travel Screening: Not Applicable   Thank you!  [facility]      Date of Service: